# Patient Record
Sex: FEMALE | Race: WHITE | NOT HISPANIC OR LATINO | Employment: OTHER | URBAN - METROPOLITAN AREA
[De-identification: names, ages, dates, MRNs, and addresses within clinical notes are randomized per-mention and may not be internally consistent; named-entity substitution may affect disease eponyms.]

---

## 2017-01-16 ENCOUNTER — TRANSCRIBE ORDERS (OUTPATIENT)
Dept: LAB | Facility: CLINIC | Age: 82
End: 2017-01-16

## 2017-01-16 ENCOUNTER — APPOINTMENT (OUTPATIENT)
Dept: LAB | Facility: CLINIC | Age: 82
End: 2017-01-16
Payer: MEDICARE

## 2017-01-16 ENCOUNTER — GENERIC CONVERSION - ENCOUNTER (OUTPATIENT)
Dept: OTHER | Facility: OTHER | Age: 82
End: 2017-01-16

## 2017-01-16 DIAGNOSIS — I48.91 ATRIAL FIBRILLATION (HCC): ICD-10-CM

## 2017-01-16 LAB
INR PPP: 2.89 (ref 0.86–1.16)
PROTHROMBIN TIME: 29.7 SECONDS (ref 12–14.3)

## 2017-01-16 PROCEDURE — 85610 PROTHROMBIN TIME: CPT

## 2017-01-16 PROCEDURE — 36415 COLL VENOUS BLD VENIPUNCTURE: CPT

## 2017-02-13 DIAGNOSIS — I48.91 ATRIAL FIBRILLATION (HCC): ICD-10-CM

## 2017-02-23 ENCOUNTER — GENERIC CONVERSION - ENCOUNTER (OUTPATIENT)
Dept: OTHER | Facility: OTHER | Age: 82
End: 2017-02-23

## 2017-03-03 ENCOUNTER — APPOINTMENT (OUTPATIENT)
Dept: LAB | Facility: CLINIC | Age: 82
End: 2017-03-03
Payer: MEDICARE

## 2017-03-03 ENCOUNTER — GENERIC CONVERSION - ENCOUNTER (OUTPATIENT)
Dept: OTHER | Facility: OTHER | Age: 82
End: 2017-03-03

## 2017-03-03 DIAGNOSIS — I48.91 ATRIAL FIBRILLATION (HCC): ICD-10-CM

## 2017-03-03 LAB
INR PPP: 2.12 (ref 0.86–1.16)
PROTHROMBIN TIME: 23.5 SECONDS (ref 12–14.3)

## 2017-03-03 PROCEDURE — 85610 PROTHROMBIN TIME: CPT

## 2017-03-03 PROCEDURE — 36415 COLL VENOUS BLD VENIPUNCTURE: CPT

## 2017-03-27 ENCOUNTER — ALLSCRIPTS OFFICE VISIT (OUTPATIENT)
Dept: OTHER | Facility: OTHER | Age: 82
End: 2017-03-27

## 2017-03-27 DIAGNOSIS — I10 ESSENTIAL (PRIMARY) HYPERTENSION: ICD-10-CM

## 2017-03-30 DIAGNOSIS — I48.91 ATRIAL FIBRILLATION (HCC): ICD-10-CM

## 2017-04-05 ENCOUNTER — LAB CONVERSION - ENCOUNTER (OUTPATIENT)
Dept: OTHER | Facility: OTHER | Age: 82
End: 2017-04-05

## 2017-04-05 ENCOUNTER — APPOINTMENT (OUTPATIENT)
Dept: LAB | Facility: CLINIC | Age: 82
End: 2017-04-05
Payer: MEDICARE

## 2017-04-05 DIAGNOSIS — I48.91 ATRIAL FIBRILLATION (HCC): ICD-10-CM

## 2017-04-05 LAB
INR PPP: 2.67 (ref 0.86–1.16)
PROTHROMBIN TIME: 28 SECONDS (ref 12–14.3)

## 2017-04-05 PROCEDURE — 85610 PROTHROMBIN TIME: CPT

## 2017-04-05 PROCEDURE — 36415 COLL VENOUS BLD VENIPUNCTURE: CPT

## 2017-05-03 DIAGNOSIS — I48.91 ATRIAL FIBRILLATION (HCC): ICD-10-CM

## 2017-05-11 ENCOUNTER — GENERIC CONVERSION - ENCOUNTER (OUTPATIENT)
Dept: OTHER | Facility: OTHER | Age: 82
End: 2017-05-11

## 2017-05-11 ENCOUNTER — APPOINTMENT (OUTPATIENT)
Dept: LAB | Facility: CLINIC | Age: 82
End: 2017-05-11
Payer: MEDICARE

## 2017-05-11 ENCOUNTER — TRANSCRIBE ORDERS (OUTPATIENT)
Dept: MRI IMAGING | Facility: CLINIC | Age: 82
End: 2017-05-11

## 2017-05-11 DIAGNOSIS — I48.91 ATRIAL FIBRILLATION (HCC): ICD-10-CM

## 2017-05-11 LAB
INR PPP: 3.47 (ref 0.86–1.16)
PROTHROMBIN TIME: 35.4 SECONDS (ref 12.1–14.4)

## 2017-05-11 PROCEDURE — 36415 COLL VENOUS BLD VENIPUNCTURE: CPT

## 2017-05-11 PROCEDURE — 85610 PROTHROMBIN TIME: CPT

## 2017-05-18 ENCOUNTER — APPOINTMENT (OUTPATIENT)
Dept: LAB | Facility: CLINIC | Age: 82
End: 2017-05-18
Payer: MEDICARE

## 2017-05-18 ENCOUNTER — GENERIC CONVERSION - ENCOUNTER (OUTPATIENT)
Dept: OTHER | Facility: OTHER | Age: 82
End: 2017-05-18

## 2017-05-18 DIAGNOSIS — I48.91 ATRIAL FIBRILLATION (HCC): ICD-10-CM

## 2017-05-18 LAB
INR PPP: 2.1 (ref 0.86–1.16)
PROTHROMBIN TIME: 23.8 SECONDS (ref 12.1–14.4)

## 2017-05-18 PROCEDURE — 85610 PROTHROMBIN TIME: CPT

## 2017-05-18 PROCEDURE — 36415 COLL VENOUS BLD VENIPUNCTURE: CPT

## 2017-06-15 DIAGNOSIS — I48.91 ATRIAL FIBRILLATION (HCC): ICD-10-CM

## 2017-06-22 ENCOUNTER — APPOINTMENT (OUTPATIENT)
Dept: LAB | Facility: CLINIC | Age: 82
End: 2017-06-22
Payer: MEDICARE

## 2017-06-22 ENCOUNTER — TRANSCRIBE ORDERS (OUTPATIENT)
Dept: MRI IMAGING | Facility: CLINIC | Age: 82
End: 2017-06-22

## 2017-06-22 ENCOUNTER — GENERIC CONVERSION - ENCOUNTER (OUTPATIENT)
Dept: OTHER | Facility: OTHER | Age: 82
End: 2017-06-22

## 2017-06-22 DIAGNOSIS — I48.91 ATRIAL FIBRILLATION (HCC): ICD-10-CM

## 2017-06-22 LAB
INR PPP: 2.66 (ref 0.86–1.16)
PROTHROMBIN TIME: 28.7 SECONDS (ref 12.1–14.4)

## 2017-06-22 PROCEDURE — 36415 COLL VENOUS BLD VENIPUNCTURE: CPT

## 2017-06-22 PROCEDURE — 85610 PROTHROMBIN TIME: CPT

## 2017-07-20 DIAGNOSIS — I48.91 ATRIAL FIBRILLATION (HCC): ICD-10-CM

## 2017-07-25 ENCOUNTER — APPOINTMENT (OUTPATIENT)
Dept: LAB | Facility: CLINIC | Age: 82
End: 2017-07-25
Payer: MEDICARE

## 2017-07-25 DIAGNOSIS — I48.91 ATRIAL FIBRILLATION (HCC): ICD-10-CM

## 2017-07-25 LAB
INR PPP: 3.7 (ref 0.86–1.16)
PROTHROMBIN TIME: 37.3 SECONDS (ref 12.1–14.4)

## 2017-07-25 PROCEDURE — 85610 PROTHROMBIN TIME: CPT

## 2017-07-25 PROCEDURE — 36415 COLL VENOUS BLD VENIPUNCTURE: CPT

## 2017-08-02 ENCOUNTER — TRANSCRIBE ORDERS (OUTPATIENT)
Dept: LAB | Facility: CLINIC | Age: 82
End: 2017-08-02

## 2017-08-02 ENCOUNTER — GENERIC CONVERSION - ENCOUNTER (OUTPATIENT)
Dept: OTHER | Facility: OTHER | Age: 82
End: 2017-08-02

## 2017-08-02 ENCOUNTER — APPOINTMENT (OUTPATIENT)
Dept: LAB | Facility: CLINIC | Age: 82
End: 2017-08-02
Payer: MEDICARE

## 2017-08-02 DIAGNOSIS — I48.91 ATRIAL FIBRILLATION (HCC): ICD-10-CM

## 2017-08-02 LAB
INR PPP: 2.72 (ref 0.86–1.16)
PROTHROMBIN TIME: 29.2 SECONDS (ref 12.1–14.4)

## 2017-08-02 PROCEDURE — 36415 COLL VENOUS BLD VENIPUNCTURE: CPT

## 2017-08-02 PROCEDURE — 85610 PROTHROMBIN TIME: CPT

## 2017-08-21 ENCOUNTER — APPOINTMENT (OUTPATIENT)
Dept: LAB | Facility: CLINIC | Age: 82
End: 2017-08-21
Payer: MEDICARE

## 2017-08-21 ENCOUNTER — GENERIC CONVERSION - ENCOUNTER (OUTPATIENT)
Dept: OTHER | Facility: OTHER | Age: 82
End: 2017-08-21

## 2017-08-21 DIAGNOSIS — I48.91 ATRIAL FIBRILLATION (HCC): ICD-10-CM

## 2017-08-21 LAB
INR PPP: 2.56 (ref 0.86–1.16)
PROTHROMBIN TIME: 27.8 SECONDS (ref 12.1–14.4)

## 2017-08-21 PROCEDURE — 85610 PROTHROMBIN TIME: CPT

## 2017-08-21 PROCEDURE — 36415 COLL VENOUS BLD VENIPUNCTURE: CPT

## 2017-08-28 ENCOUNTER — GENERIC CONVERSION - ENCOUNTER (OUTPATIENT)
Dept: OTHER | Facility: OTHER | Age: 82
End: 2017-08-28

## 2017-08-28 ENCOUNTER — ALLSCRIPTS OFFICE VISIT (OUTPATIENT)
Dept: OTHER | Facility: OTHER | Age: 82
End: 2017-08-28

## 2017-08-28 DIAGNOSIS — I10 ESSENTIAL (PRIMARY) HYPERTENSION: ICD-10-CM

## 2017-09-06 ENCOUNTER — APPOINTMENT (OUTPATIENT)
Dept: LAB | Facility: CLINIC | Age: 82
End: 2017-09-06
Payer: MEDICARE

## 2017-09-06 DIAGNOSIS — I10 ESSENTIAL (PRIMARY) HYPERTENSION: ICD-10-CM

## 2017-09-06 LAB
ALBUMIN SERPL BCP-MCNC: 3.5 G/DL (ref 3.5–5)
ALP SERPL-CCNC: 66 U/L (ref 46–116)
ALT SERPL W P-5'-P-CCNC: 12 U/L (ref 12–78)
ANION GAP SERPL CALCULATED.3IONS-SCNC: 5 MMOL/L (ref 4–13)
AST SERPL W P-5'-P-CCNC: 17 U/L (ref 5–45)
BASOPHILS # BLD AUTO: 0.04 THOUSANDS/ΜL (ref 0–0.1)
BASOPHILS NFR BLD AUTO: 1 % (ref 0–1)
BILIRUB SERPL-MCNC: 1.15 MG/DL (ref 0.2–1)
BUN SERPL-MCNC: 27 MG/DL (ref 5–25)
CALCIUM SERPL-MCNC: 9.3 MG/DL (ref 8.3–10.1)
CHLORIDE SERPL-SCNC: 102 MMOL/L (ref 100–108)
CHOLEST SERPL-MCNC: 167 MG/DL (ref 50–200)
CO2 SERPL-SCNC: 33 MMOL/L (ref 21–32)
CREAT SERPL-MCNC: 0.74 MG/DL (ref 0.6–1.3)
EOSINOPHIL # BLD AUTO: 0.07 THOUSAND/ΜL (ref 0–0.61)
EOSINOPHIL NFR BLD AUTO: 2 % (ref 0–6)
ERYTHROCYTE [DISTWIDTH] IN BLOOD BY AUTOMATED COUNT: 13.4 % (ref 11.6–15.1)
GFR SERPL CREATININE-BSD FRML MDRD: 75 ML/MIN/1.73SQ M
GLUCOSE P FAST SERPL-MCNC: 84 MG/DL (ref 65–99)
HCT VFR BLD AUTO: 40.7 % (ref 34.8–46.1)
HDLC SERPL-MCNC: 60 MG/DL (ref 40–60)
HGB BLD-MCNC: 12.9 G/DL (ref 11.5–15.4)
LDLC SERPL CALC-MCNC: 87 MG/DL (ref 0–100)
LYMPHOCYTES # BLD AUTO: 1.28 THOUSANDS/ΜL (ref 0.6–4.47)
LYMPHOCYTES NFR BLD AUTO: 28 % (ref 14–44)
MCH RBC QN AUTO: 29.7 PG (ref 26.8–34.3)
MCHC RBC AUTO-ENTMCNC: 31.7 G/DL (ref 31.4–37.4)
MCV RBC AUTO: 94 FL (ref 82–98)
MONOCYTES # BLD AUTO: 0.48 THOUSAND/ΜL (ref 0.17–1.22)
MONOCYTES NFR BLD AUTO: 10 % (ref 4–12)
NEUTROPHILS # BLD AUTO: 2.78 THOUSANDS/ΜL (ref 1.85–7.62)
NEUTS SEG NFR BLD AUTO: 59 % (ref 43–75)
NRBC BLD AUTO-RTO: 0 /100 WBCS
PLATELET # BLD AUTO: 126 THOUSANDS/UL (ref 149–390)
PMV BLD AUTO: 11.2 FL (ref 8.9–12.7)
POTASSIUM SERPL-SCNC: 4.3 MMOL/L (ref 3.5–5.3)
PROT SERPL-MCNC: 7.1 G/DL (ref 6.4–8.2)
RBC # BLD AUTO: 4.35 MILLION/UL (ref 3.81–5.12)
SODIUM SERPL-SCNC: 140 MMOL/L (ref 136–145)
TRIGL SERPL-MCNC: 98 MG/DL
TSH SERPL DL<=0.05 MIU/L-ACNC: 1 UIU/ML (ref 0.36–3.74)
WBC # BLD AUTO: 4.66 THOUSAND/UL (ref 4.31–10.16)

## 2017-09-06 PROCEDURE — 84443 ASSAY THYROID STIM HORMONE: CPT

## 2017-09-06 PROCEDURE — 85025 COMPLETE CBC W/AUTO DIFF WBC: CPT

## 2017-09-06 PROCEDURE — 80061 LIPID PANEL: CPT

## 2017-09-06 PROCEDURE — 80053 COMPREHEN METABOLIC PANEL: CPT

## 2017-09-06 PROCEDURE — 36415 COLL VENOUS BLD VENIPUNCTURE: CPT

## 2017-09-18 DIAGNOSIS — I48.91 ATRIAL FIBRILLATION (HCC): ICD-10-CM

## 2017-09-27 ENCOUNTER — TRANSCRIBE ORDERS (OUTPATIENT)
Dept: LAB | Facility: CLINIC | Age: 82
End: 2017-09-27

## 2017-09-27 ENCOUNTER — APPOINTMENT (OUTPATIENT)
Dept: LAB | Facility: CLINIC | Age: 82
End: 2017-09-27
Payer: MEDICARE

## 2017-09-27 ENCOUNTER — GENERIC CONVERSION - ENCOUNTER (OUTPATIENT)
Dept: OTHER | Facility: OTHER | Age: 82
End: 2017-09-27

## 2017-09-27 DIAGNOSIS — I48.91 ATRIAL FIBRILLATION (HCC): ICD-10-CM

## 2017-09-27 LAB
INR PPP: 2.48 (ref 0.86–1.16)
PROTHROMBIN TIME: 27.1 SECONDS (ref 12.1–14.4)

## 2017-09-27 PROCEDURE — 36415 COLL VENOUS BLD VENIPUNCTURE: CPT

## 2017-09-27 PROCEDURE — 85610 PROTHROMBIN TIME: CPT

## 2017-10-24 NOTE — PROGRESS NOTES
Assessment  Assessed    1  Coronary arteriosclerosis (414 00) (I25 10)   2  Hypertension (401 9) (I10)   3  Rib pain on right side (786 50) (R07 81)   4  Atrial fibrillation (427 31) (I48 91)   5  Anticoagulant long-term use (V58 61) (Z79 01)   6  Venous insufficiency (459 81) (I87 2)   7  Chronic obstructive pulmonary disease (496) (J44 9)    Plan  Atrial fibrillation    · (1) CBC/PLT/DIFF; Status:Canceled;    Perform:[facility];Ordered; For:Atrial fibrillation; Ordered By:Robin Drummond;   · (1) COMPREHENSIVE METABOLIC PANEL; Status:Canceled;    Perform:[facility];Ordered; For:Atrial fibrillation; Ordered By:Robin Drummond;  Atrial fibrillation, Hyperlipidemia    · (1) LIPID PANEL, FASTING; Status:Canceled;    Perform:[facility]; For:Atrial fibrillation, Hyperlipidemia; Ordered By:Robin Drummond; Hypertension    · (1) CBC/PLT/DIFF; Status:Active; Requested for:28Aug2017;    Perform:[facility]; Due:28Aug2018; Ordered;For:Hypertension; Ordered By:Robin Drummond;   · (1) COMPREHENSIVE METABOLIC PANEL; Status:Active; Requested for:28Aug2017;    Perform:[facility]; Due:28Aug2018; Ordered;For:Hypertension; Ordered By:Robin Drummond;   · (1) LIPID PANEL, FASTING; Status:Active; Requested for:28Aug2017;    Perform:[facility]; Due:28Aug2018; Ordered;For:Hypertension; Ordered By:Robin Drummond;   · (1) TSH; Status:Active; Requested for:28Aug2017;    Perform:[facility]; Due:28Aug2018; Ordered;For:Hypertension; Ordered By:Elie Drummond;  Rib pain on right side    · Vicodin 5-300 MG Oral Tablet; TAKE 1 TABLET EVERY 4 TO 6 HOURS AS NEEDED  FOR PAIN   Rx By: Nevin Painter; Dispense: 4 Days ; #:20 Tablet;  Refill: 0;For: Rib pain on right side; LACIE = N; Print Rx    Discussion/Summary  Cardiology Discussion Summary Free Text Note Form [facility]:   Pt is cardiovascularly stable-- no angina CHF symptomatic ectopy has A  fib with controlled rate no bleeding on anticoagulationpt/inr report any bleeding--reviewed lab tests from April 2016 with cholesterol of 180 normal CBC normal renal function normal LFTs normal sugar follow-up lab test ordereds with pcp and pulmonarymeasures reviewed in detailsigns and symptoms of abnormal bleeding on anticoagulationall meds  Report any symptoms  All questions answered  Previous studies reviewed with patient, medications reviewed and possible side effects discussed  Continue risk factor modification  All questions answered  Safety measures reviewed-in detail particularly with anticoagulation Patient advised to report any problems promptly to medical attention  Discussed concepts of atherosclerosis, signs and symptoms of cardiac disease including ischemia arrhythmia CHF  Optimize weight, regular exercise and follow up with appropriate specialists as discussed  Return for follow up visit in 5 months or earlier if needed  Referred to derm for possible alopecia  Follow-up with pulmonary PCP all questions answered lab tests to be checkedtrial of when necessary Vicodin for uncomfortable musculoskeletal rib pain from traumaquestions answered      Counseling Documentation With Imm: The patient, patient's family was counseled regarding diagnostic results,-- instructions for management,-- risk factor reductions,-- risks and benefits of treatment options,-- importance of compliance with treatment  Chief Complaint  Chief Complaint Free Text Note Form: Patient seen for cardiac follow up evaluation-- known history of hypertension and atrial fibrillation  COPD CAD on CAT scan, venous insufficiency DJD     Chief Complaint Chronic Condition [facility]: Patient is here today for follow up of chronic conditions described in HPI  History of Present Illness  Cardiology HPI Free Text Note Form [facility]: Patient presents for follow up visit with her   History of CAD with calcifications noted on CAT scan no clinical ischemia   no history of MI preserved LV function  No angina, CHF, palpitations, syncope, seizures, CVA/TIA, dizziness, lightheadedness, amaurosis, orthostasis, myositis or edema  No urinary or bowel complaints  No rashes, fevers, arrhythmic symptoms, or thromboembolic symptoms  No PND  with markedly sedentary lifestyle uses a wheelchair cane has COPD and arthritis -previously referred to [facility] for patch of alopeciatraumatized her right lateral ribs with tenderness when necessary Vicodin follow-up with orthopedics if needed    Previous hip fracture s/p repair this past March 2014 - continues to use a walker to ambulate no falls patient is very cautious  Has history of COPD pulmonary hypertension Reports chronic stable shortness of breath with exertion-chronic coughing tendencies with occasional scant clear sputum--Occasional wheezing - in the past but not lately Denies any exertional chest pains  Is on home oxygen regular nebulizers during the day follows with pulmonary Dr Law [place] refuses vaccinationspulmonary hypertension chronic venous disease controlled atrial fib rate with no arrhythmic symptoms no neurologic complaints emotionally doing well careful with avoiding falls at hip fracture 2006 end 2014  has a history of HTN- lopressor, quinapril Afib- on coumadin, metoprolol digoxin pulmonary hypertension-venous insufficiency on Lasix  has gone for previous mammogram and colonoscopy  [ Atrial fibrillation, pulmonary hypertension, COPD, anxiety, Hip fracture 2006, kyphosis, hypertension, chronic venous disease  ]  [ No history of family CAD  ]  [ Prior heavy smoking, alcohol socially  No drug abuse     Younger brother - lives in [place], [place]  ]  CVA TIA amaurosis fevers or wheezes diplopia GI or  symptoms no history of MI history of mild MR aortic calcification preserved LV function has moderate TR with pulmonary hypertension with PA pressures of 55   good spirits has 13 grandchildren 6 great-grandchildrenbleeding bruising on anticoagulation no melena hematuria hematochezia no orthostasis myositis         Review of Systems  Cardiology Female ROS:     Cardiac: No complaints of chest pain, no palpitations, no fainting  Skin: No complaints of nonhealing sores or skin rash  Genitourinary: No complaints of recurrent urinary tract infections, frequent urination at night, difficult urination, blood in urine, kidney stones, loss of bladder control, kidney problems, denies any birth control or hormone replacement, is not post menopausal, not currently pregnant  Psychological: No complaints of feeling depressed, anxiety, panic attacks, or difficulty concentrating  General: No complaints of trouble sleeping, lack of energy, fatigue, appetite changes, weight changes, fever, frequent infections, or night sweats  Respiratory: shortness of breath--   Chronic unchanged low level some wheezing tendencies  HEENT: No complaints of serious problems, hearing problems, nose problems, throat problems, or snoring  Gastrointestinal: No complaints of liver problems, nausea, vomiting, heartburn, constipation, bloody stools, diarrhea, problems swallowing, adbominal pain, or rectal bleeding  Hematologic: No complaints of bleeding disorders, anemia, blood clots, or excessive brusing  Neurological: No complaints of numbness, tingling, dizziness, weakness, seizures, headaches, syncope or fainting, AM fatigue, daytime sleepiness, no witnessed apnea episodes  Musculoskeletal: arthritis      Active Problems  Problems    1  Alopecia (704 00) (L65 9)   2  Anticoagulant long-term use (V58 61) (Z79 01)   3  Atrial fibrillation (427 31) (I48 91)   4  Chronic obstructive pulmonary disease (496) (J44 9)   5  Coronary arteriosclerosis (414 00) (I25 10)   6  Ecchymosis (459 89) (R58)   7  Hyperlipidemia (272 4) (E78 5)   8  Hypertension (401 9) (I10)   9  Primary pulmonary hypertension (416 0) (I27 0)   10   Pulmonary artery hypertension (416 8) (I27 2)   11  Pulmonary emphysema (492 8) (J43 9)   12  Rib pain on right side (786 50) (R07 81)   13  Shortness of breath (786 05) (R06 02)   14  Venous insufficiency (459 81) (I87 2)    Past Medical History  Problems    1  History of Anxiety (300 00) (F41 9)   2  History of Echo (2-D)   3  History of Echocardiogram   4  History of Echocardiogram   5  History of Echocardiogram   6  History of Emphysema (492 8) (J43 9)   7  History of atrial fibrillation (V12 59) (Z86 79)   8  History of atrial fibrillation (V12 59) (Z86 79)   9  History of chronic obstructive lung disease (V12 69) (Z87 09)   10  History of fracture of hip (V15 51) (Z87 81)   11  History of hyperlipidemia (V12 29) (Z86 39)   12  History of restrictive lung disease (V12 69) (Z87 09)   13  History of shortness of breath (V13 89) (Z87 898)   14  Hypertension (401 9) (I10)   15  History of Osteoarthritis (715 90) (M19 90)   16  Personal history of kyphosis (V13 59) (Z87 39)   17  Personal history of pulmonary hypertension (V12 59) (Z86 79)   18  History of Primary pulmonary hypertension (416 0) (I27 0)   19  History of Stress Test ECG Performed   20  History of Venous insufficiency (chronic) (peripheral) (459 81) (I87 2)  Active Problems And Past Medical History Reviewed: The active problems and past medical history were reviewed and updated today  Surgical History  Problems    1  History of Hip Surgery   2  History of Reported Hx Of Hip Replacement  Surgical History Reviewed: The surgical history was reviewed and updated today  Family History  Family History    1  Family history of Cardiovascular Disorder (V17 49)  Family History Reviewed: The family history was reviewed and updated today  Social History  Problems    · Alcohol Use (History)   · Former smoker (C83 64) (O59 337)  Social History Reviewed: The social history was reviewed and updated today  Current Meds   1   Albuterol Sulfate (2 5 MG/3ML) 0 083% Inhalation Nebulization Solution; One vial via   nebulizer once daily when necessary; Therapy: 37CGJ4075 to Recorded   2  Brovana 15 MCG/2ML Inhalation Nebulization Solution; INHALE THE CONTENTS OF 1   VIAL 2 TIMES DAILY; Therapy: 69BPN8018 to (Evaluate:14Oct2016); Last Rx:88Nbq6452 Ordered   3  Digoxin 125 MCG Oral Tablet; Take 1 tablet daily; Therapy: 05MGN5332 to (Last Rx:26Amk0232)  Requested for: 49HLV0888 Ordered   4  Furosemide 20 MG Oral Tablet; TAKE 1 TABLET THREE TIMES A DAY; Therapy: 69Ril7257 to (Last Rx:89Pvz0369)  Requested for: 32Eqh1335 Ordered   5  Ipratropium-Albuterol 0 5-2 5 (3) MG/3ML Inhalation Solution; one vial via nebulizer once   daily; Therapy: 16JJT3764 to (Evaluate:07Nov2015); Last Rx:87Rvv1296 Ordered   6  Klor-Con M20 20 MEQ Oral Tablet Extended Release; Take 1 tablet daily; Therapy: 24IMM2190 to (Last Rx:04Jun2015)  Requested for: 48BZT5831 Ordered   7  Metoprolol Succinate ER 50 MG Oral Tablet Extended Release 24 Hour; Take 1 tablet   daily; Therapy: 29KVQ9355 to (Last Rx:27Mar2017)  Requested for: 27Mar2017 Ordered   8  Multi Vitamin/Minerals TABS; TAKE 1 TABLET DAILY; Therapy: (Recorded:08Jan2014) to Recorded   9  Potassium Chloride Hope ER 20 MEQ Oral Tablet Extended Release; Take 1 tablet daily; Therapy: 53JDQ2488 to (Last UX:20PJM4407)  Requested for: 21UKY3309 Ordered   10  Quinapril HCl - 20 MG Oral Tablet; TAKE ONE TABLETS DAILY; Therapy: 55FAN8306 to  Requested for: 89ISY0133 Recorded   11  Warfarin Sodium 2 MG Oral Tablet; TAKE 1 TO 2 TABLETS DAILY AS DIRECTED; Therapy: 29Bof8874 to (Last Rx:02Nov2016)  Requested for: 07MRB0903 Ordered   12  Zolpidem Tartrate 10 MG Oral Tablet; 1 TAB PO QD HS; Therapy: 50Csf2691 to (Last Rx:70Djy1180) Ordered  Medication List Reviewed: The medication list was reviewed and updated today  Allergies  Medication    1   No Known Drug Allergies    Vitals  Vital Signs    Recorded: 56Ycs9802 01:55PM   Heart Rate 64   Systolic 284   Diastolic 62   Height 5 ft 3 in   Weight 130 lb 8 0 oz   BMI Calculated 23 12   BSA Calculated 1 62     Physical Exam    Constitutional   General appearance: Abnormal  -- frail elderly female in a wheelchair  Eyes   Conjunctiva and Sclera examination: Conjunctiva pink, sclera anicteric  Ears, Nose, Mouth, and Throat - External inspection of ears and nose: Normal without deformities or discharge  -- Nasal mucosa, septum, and turbinates: Normal, no edema or discharge  -- Oropharynx: Clear, nares are clear, mucous membranes are moist    Neck   Neck and thyroid: Normal, supple, trachea midline, no thyromegaly  Pulmonary   Respiratory effort: No increased work of breathing or signs of respiratory distress  -- There all chest able to speak with no difficulty or shortness of breath  Auscultation of lungs: Abnormal  -- markedly diminished breath sounds with fine crackles  Cardiovascular   Palpation of heart: Normal PMI, no thrills  Auscultation of heart: Abnormal  -- S1-S2 normal 2/6 systolic ejection murmur at left sternal border  Carotid pulses: Normal, 2+ bilaterally  Femoral pulses: Normal, 2+ bilaterally  No abdominal aorta pulsations  Pedal pulses: Normal, 2+ bilaterally  Examination of extremities for edema and/or varicosities: Abnormal  -- 1-2+ edema marked varicosities and venous insufficiency skin changes  Chest - Chest: Abnormal -- kyphosis  Abdomen   Abdomen: Non-tender and no distention  Liver and spleen: No hepatomegaly or splenomegaly  Musculoskeletal Gait and station: Abnormal -- Using a walker/wheelchair to ambulate  -- Digits and nails: Normal without clubbing or cyanosis  Skin - Skin and subcutaneous tissue: Abnormal -- skin changes of stasis dermatitis marked pigmentation noted chronic edema venous insufficiency of the legs- balding spot on scalp     Neurologic - Speech: Normal     Psychiatric - Orientation to person, place, and time: Normal -- Mood and affect: Normal    Additional Findings - Tenderness localized left lateral lower ribs no ecchymosis shingles rash or obvious fracture        Signatures   Electronically signed by : WILL Trammell ; Aug 28 2017  3:55PM EST                       (Author)

## 2017-10-25 DIAGNOSIS — I48.91 ATRIAL FIBRILLATION (HCC): ICD-10-CM

## 2017-11-03 ENCOUNTER — GENERIC CONVERSION - ENCOUNTER (OUTPATIENT)
Dept: OTHER | Facility: OTHER | Age: 82
End: 2017-11-03

## 2017-11-03 ENCOUNTER — TRANSCRIBE ORDERS (OUTPATIENT)
Dept: LAB | Facility: CLINIC | Age: 82
End: 2017-11-03

## 2017-11-03 ENCOUNTER — APPOINTMENT (OUTPATIENT)
Dept: LAB | Facility: CLINIC | Age: 82
End: 2017-11-03
Payer: MEDICARE

## 2017-11-03 DIAGNOSIS — I48.91 ATRIAL FIBRILLATION (HCC): ICD-10-CM

## 2017-11-03 LAB
INR PPP: 2.35 (ref 0.86–1.16)
PROTHROMBIN TIME: 26 SECONDS (ref 12.1–14.4)

## 2017-11-03 PROCEDURE — 36415 COLL VENOUS BLD VENIPUNCTURE: CPT

## 2017-11-03 PROCEDURE — 85610 PROTHROMBIN TIME: CPT

## 2017-11-30 DIAGNOSIS — I48.91 ATRIAL FIBRILLATION (HCC): ICD-10-CM

## 2017-12-08 ENCOUNTER — TRANSCRIBE ORDERS (OUTPATIENT)
Dept: LAB | Facility: CLINIC | Age: 82
End: 2017-12-08

## 2017-12-08 ENCOUNTER — LAB CONVERSION - ENCOUNTER (OUTPATIENT)
Dept: OTHER | Facility: OTHER | Age: 82
End: 2017-12-08

## 2017-12-08 ENCOUNTER — APPOINTMENT (OUTPATIENT)
Dept: LAB | Facility: CLINIC | Age: 82
End: 2017-12-08
Payer: MEDICARE

## 2017-12-08 DIAGNOSIS — I48.91 ATRIAL FIBRILLATION (HCC): ICD-10-CM

## 2017-12-08 LAB
INR PPP: 2.85 (ref 0.86–1.16)
INR PPP: 2.85 (ref 0.86–1.16)
PROTHROMBIN TIME: 30.3 SECONDS (ref 12.1–14.4)

## 2017-12-08 PROCEDURE — 85610 PROTHROMBIN TIME: CPT

## 2017-12-08 PROCEDURE — 36415 COLL VENOUS BLD VENIPUNCTURE: CPT

## 2018-01-09 NOTE — RESULT NOTES
Verified Results  (1) PT WITH INR 51Djk1367 10:41AM Desi Mcghee Order Number: [id number]     Test Name Result Flag Reference   INR 2 67 H 0 86-1 16   PT 28 0 seconds H 12 0-14 3

## 2018-01-10 NOTE — RESULT NOTES
Verified Results  (1) PT WITH INR 57Hgi9542 09:47AM Karlie Thompson Order Number: [id number]     Order Number: [id number]     Test Name Result Flag Reference   INR 3 09 H 0 86-1 16   PT 30 2 seconds H 11 8-14 1       Plan  Atrial fibrillation, Chronic obstructive pulmonary disease, Coronary arteriosclerosis,  Hyperlipidemia, Hypertension, Primary pulmonary hypertension    · Metoprolol Succinate ER 50 MG Oral Tablet Extended Release 24 Hour;  Take 1  tablet daily

## 2018-01-10 NOTE — PROGRESS NOTES
REPORT NAME: Patient Visit Summary Report   VISIT DATE: 1/16/2017  VISIT TIME: 10:42 AM EST  PATIENT NAME: Luis F Guerrero  MEDICAL RECORD NUMBER: [id number]  SOCIAL SECURITY NUMBER:   YOB: 1934  AGE: 80  REFERRING PHYSICIAN: Irma Mayfield  SUPERVISING CLINICIAN: Irma Mayfield  HEALTH CARE PROFESSIONAL: Ai Acosta   PATIENT HOME ADDRESS: [address] PHONE: [phone]  DIAGNOSIS 1: Atrial Fibrillation / 427 31  DIAGNOSIS 2: Long-term (current) use of anticoagulants / V58 61  DIAGNOSIS 3:   DIAGNOSIS 4:   INR RANGE: 2 - 3  INR GOAL: 2 5  TREATMENT START DATE:   TREATMENT END DATE:   NEXT VISIT:       VISIT RESULTS   ENCOUNTER NUMBER:   TEST LOCATION: [facility]  TEST TYPE: Outside Lab (Venipuncture)  VISIT TYPE:   CURRENT INR: 2 89 PROTIME:   SPECIMEN COL AND RPT DATE: 1/16/2017 10:42 AM  EST    VITAL SIGNS  PULSE:  B/P:  WEIGHT:  HEIGHT:  TEMP:     CURRENT ANTICOAGULANT DOSING SCHEDULE  DOSE SIZE: 5mg    ANTICOAGULANT TYPE: COUMADIN  DOSING REGIMEN  Sun       Mon Tues Wed Thurs Fri       Sat  Total/Wk  2         1         2         1         2         1         2         11    PATIENT MEDICATION INSTRUCTION: Yes  PATIENT NUTRITIONAL COUNSELING: No  PATIENT BRUISING INSTRUCTION: No      LAST EDUCATION DATE:       PREVIOUS VISIT INFORMATION  VISITDATE   INR Goal  INR   Sun     Mon Tues Wed Thurs Fri  Sat     Total/wk  1/16/2017   2 5       2 89  2       1       2       1       2       1  2       11  11/25/2016  2 5       2 89  2       1       2       1       2       1  2       11  10/24/2016  2 5       2 67  2       1       2       1       2       1  2       11  9/28/2016   2 5       3 19  2       1       2       1       2       1  2       11    ADDITIONAL PREVIOUS VISIT INFORMATION  VISITDATE   PRIMARY RX               DOSE      CrCl  1/16/2017   COUMADIN                 5mg 11/25/2016  COUMADIN                 5mg                 10/24/2016  COUMADIN                 5mg                 9/28/2016   COUMADIN                 5mg                     OTHER CURRENT MEDICATIONS: COUMADIN      PROGRESS NOTES: PER DR FLORES SAME DOSE RECHECK 4 WEEKS, HE SPOKE WITH PT LAST  NIGHT    PATIENT INSTRUCTIONS: SEE PROGRESS NOTE    TEST LOCATION: [facility])    Electronically signed by: Ana Laura Eaton  on 1/17/2017 at 10:42 AM EST

## 2018-01-10 NOTE — PROGRESS NOTES
REPORT NAME: Progress Notes Report  VISIT DATE: 8/2/2017  VISIT TIME: 4:12 PM EDT  PATIENT NAME: Evgeny Francisco  MEDICAL RECORD NUMBER: [id number]  YOB: 1934  AGE: 80  REFERRING PHYSICIAN: Perez Ritter  SUPERVISING CLINICIAN: Arpita Oliveira CARE PROVIDER: Ashley Crum  PATIENT HOME ADDRESS: [address] PHONE: [phone]  SOCIAL SECURITY NUMBER:   DIAGNOSIS 1: Atrial Fibrillation / 427 31  DIAGNOSIS 2: Long-term (current) use of anticoagulants / V58 61  INR RANGE: 2 - 3  INR GOAL: 2 5  TREATMENT START DATE:   TREATMENT END DATE:   NEXT VISIT:     VISIT RESULTS  ENCOUNTER NUMBER:   TEST LOCATION: [facility]  TEST TYPE: Outside Lab (Venipuncture)  VISIT TYPE:   CURRENT INR: 2 72 PROTIME:   SPECIMEN COL AND RPT DATE: 8/2/2017 4:12 PM  EDT  VITAL SIGNS  PULSE:  BP: / WEIGHT:  HEIGHT:  TEMP:   CURRENT ANTICOAGULANT DOSING SCHEDULE  DOSE SIZE: 5mg    ANTICOAGULANT TYPE: COUMADIN  DOSING REGIMEN  Sun       Mon Tues Wed Thurs Fri       Sat  Total/Wk  2         1         1         2         1         1         2         10  PATIENT MEDICATION INSTRUCTION: Yes  PATIENT NUTRITIONAL COUNSELING: No  PATIENT BRUISING INSTRUCTION: No  LAST EDUCATION DATE:   PREVIOUS VISIT INFORMATION  VISITDATE  INRGoal INR   Sun    Mon    Tues   Wed    Thurs  Fri    Sat  Total/wk  8/2/2017    2 5     2 72  2      1      1      2      1      1      2  10  7/25/2017   2 5     3 7   2      1      1      2      1      1      2  10  6/22/2017   2 5     2 66  2      1      2      1      2      1      2  11  5/18/2017   2 5     2 1   2      1      2      1      2      1      2  11  ADDITIONAL PREVIOUS VISIT INFORMATION  VISITDATE   PRIMARY RX               DOSE      CrCl  8/2/2017    COUMADIN                 5mg  7/25/2017   COUMADIN                 5mg  6/22/2017   COUMADIN                 5mg  5/18/2017   COUMADIN                 5mg  OTHER CURRENT MEDICATIONS:  COUMADIN  PROGRESS NOTES: PER DR FLORES SAME DOSE RECHECK 2 WEEKS, SPOKE WITH PT  PATIENT INSTRUCTIONS: SEE PROGRESS NOTE  TEST LOCATION: [facility]), , ,   INBOUND LAB DATA:  Lab       Lab Value Col Date                 Rpt Date                 Lab  Reference Range  Electronically signed by: Dustin Jefferson on 8/2/2017 4:12 PM EDT

## 2018-01-10 NOTE — RESULT NOTES
Verified Results  (1) TSH [id number] 09:51AM Edgar Sevilla Order Number: [id number]     Test Name Result Flag Reference   TSH 1 000 uIU/mL  0 358-3 740   Patients undergoing fluorescein dye angiography may retain small amounts of fluorescein in the body for 48-72 hours post procedure  Samples containing fluorescein can produce falsely depressed TSH values  If the patient had this procedure,a specimen should be resubmitted post fluorescein clearance            The recommended reference ranges for TSH during pregnancy are as follows:  First trimester 0 1 to 2 5 uIU/mL  Second trimester  0 2 to 3 0 uIU/mL  Third trimester 0 3 to 3 0 uIU/m

## 2018-01-11 NOTE — PROGRESS NOTES
REPORT NAME: Patient Visit Summary Report   VISIT DATE: 9/14/2016  VISIT TIME: 2:16 PM EDT  PATIENT NAME: Mark Leong  MEDICAL RECORD NUMBER: [id number]  SOCIAL SECURITY NUMBER:    YOB: 1934  AGE: 80  REFERRING PHYSICIAN: Johana Rain  SUPERVISING CLINICIAN: Johana Rain  HEALTH CARE PROFESSIONAL: Bobby Officer   PATIENT HOME ADDRESS: [place] [address], [address] PHONE:  [phone]  DIAGNOSIS 1: Atrial Fibrillation / 427 31  DIAGNOSIS 2: Long-term (current) use of anticoagulants / V58 61  DIAGNOSIS 3:   DIAGNOSIS 4:   INR RANGE: 2 - 3  INR GOAL: 2 5  TREATMENT START DATE:   TREATMENT END DATE:    NEXT VISIT:       VISIT RESULTS   ENCOUNTER NUMBER:   TEST LOCATION: [facility]  TEST TYPE: Outside Lab (Venipuncture)  VISIT TYPE:   CURRENT INR: 3 57 PROTIME:   SPECIMEN COL AND RPT DATE: 9/14/2016 2:16 PM  EDT     VITAL SIGNS  PULSE:  B/P:  WEIGHT:  HEIGHT:  TEMP:     CURRENT ANTICOAGULANT DOSING SCHEDULE  DOSE SIZE: 5mg    ANTICOAGULANT TYPE: COUMADIN  DOSING REGIMEN  Sun       Mon Tues Wed Thurs Fri       Sat  Total/Wk   2         1         2         1         2         1         2         11    PATIENT MEDICATION INSTRUCTION: Yes  PATIENT NUTRITIONAL COUNSELING: No  PATIENT BRUISING INSTRUCTION: No      LAST EDUCATION DATE:       PREVIOUS VISIT  INFORMATION  VISITDATE   INR Goal  INR   Sun     Mon Tues Wed Thurs Fri  Sat     Total/wk  9/14/2016   2 5       3 57  2       1       2       1       2       1  2       11  8/24/2016   2 5       3 53  2       2       1        2       2       1  2       12  8/3/2016    2 5       3 31  2       2       1       2       2       1  2       12  7/13/2016   2 5       3 07  2       2       1       2       2       1  2       12    ADDITIONAL PREVIOUS VISIT INFORMATION   VISITDATE   PRIMARY RX               DOSE      CrCl  9/14/2016   COUMADIN                 5mg 8/24/2016   COUMADIN                 5mg                 8/3/2016    COUMADIN                 5mg                 7/13/2016   COUMADIN                  5mg                     OTHER CURRENT MEDICATIONS: COUMADIN      PROGRESS NOTES: PER AS/PA HOLD 1 DAY THEN 2/1MG RECHECK 2 WEEKS, SPOKE WITH  PT    PATIENT INSTRUCTIONS: SEE PROGRESS NOTE    [facility]    Electronically signed by: Watson Barber  on 9/14/2016 at 2:16 PM EDT              Electronically signed Yariel CHAVEZ    Jun 8 2017  5:36PM EST

## 2018-01-11 NOTE — RESULT NOTES
Verified Results  (1) PT WITH INR [id number] 10:44AM Nikhil Avina   TW Order Number: [id number]  TW Order Number: [id number]     Test Name Result Flag Reference   INR 2 79 H 0 86-1 16   PT 28 9 seconds H 12 0-14 3

## 2018-01-11 NOTE — PROGRESS NOTES
REPORT NAME: Patient Visit Summary Report   VISIT DATE: 11/25/2016  VISIT TIME: 9:50 AM EST  PATIENT NAME: Katlin Dooley  MEDICAL RECORD NUMBER: [id number]  SOCIAL SECURITY NUMBER:   YOB: 1934  AGE: 80  REFERRING PHYSICIAN: Louise Blue  SUPERVISING CLINICIAN: Louise Blue  HEALTH CARE PROFESSIONAL: Carlos Centeno   PATIENT HOME ADDRESS: [place] [address], [address] PHONE: [phone]  DIAGNOSIS 1: Atrial Fibrillation / 427 31  DIAGNOSIS 2: Long-term (current) use of anticoagulants / V58 61  DIAGNOSIS 3:   DIAGNOSIS 4:   INR RANGE: 2 - 3  INR GOAL: 2 5  TREATMENT START DATE:   TREATMENT END DATE:   NEXT VISIT:       VISIT RESULTS   ENCOUNTER NUMBER:   TEST LOCATION: [facility]  TEST TYPE: Outside Lab (Venipuncture)  VISIT TYPE:   CURRENT INR: 2 89 PROTIME:   SPECIMEN COL AND RPT DATE: 11/25/2016 9:50 AM  EST    VITAL SIGNS  PULSE:  B/P:  WEIGHT:  HEIGHT:  TEMP:     CURRENT ANTICOAGULANT DOSING SCHEDULE  DOSE SIZE: 5mg    ANTICOAGULANT TYPE: COUMADIN  DOSING REGIMEN  Sun       Mon Tues Wed Thurs Fri       Sat  Total/Wk  2         1         2         1         2         1         2         11    PATIENT MEDICATION INSTRUCTION: Yes  PATIENT NUTRITIONAL COUNSELING: No  PATIENT BRUISING INSTRUCTION: No      LAST EDUCATION DATE:       PREVIOUS VISIT INFORMATION  VISITDATE   INR Goal  INR   Sun     Mon Tues Wed Thurs Fri  Sat     Total/wk  11/25/2016  2 5       2 89  2       1       2       1       2       1  2       11  10/24/2016  2 5       2 67  2       1       2       1       2       1  2       11  9/28/2016   2 5       3 19  2       1       2       1       2       1  2       11  9/14/2016   2 5       3 57  2       1       2       1       2       1  2       11    ADDITIONAL PREVIOUS VISIT INFORMATION  VISITDATE   PRIMARY RX               DOSE      CrCl  11/25/2016  COUMADIN                 5mg 10/24/2016  COUMADIN                 5mg                 9/28/2016   COUMADIN                 5mg                 9/14/2016   COUMADIN                 5mg                     OTHER CURRENT MEDICATIONS: COUMADIN      PROGRESS NOTES: PER SS/NP SAME DOSE RECHECK 3 WEEKS, SPOKE WITH PT    PATIENT INSTRUCTIONS: SEE PROGRESS NOTE    TEST LOCATION: [facility]   [facility])    Electronically signed by: Mary Sher  on 11/28/2016 at 9:50 AM EST

## 2018-01-11 NOTE — PROGRESS NOTES
REPORT NAME: Progress Notes Report  VISIT DATE: 5/18/2017  VISIT TIME: 10:00 AM EDT  PATIENT NAME: Mamie ZHOU  MEDICAL RECORD NUMBER: [id number]  YOB: 1934  AGE: 80  REFERRING PHYSICIAN: Desirae Hargrove  SUPERVISING CLINICIAN: Arpita Oliveira CARE PROVIDER: Tony Pope  PATIENT HOME ADDRESS: [address] [place] [address] PHONE: [phone]  SOCIAL SECURITY NUMBER:   DIAGNOSIS 1: Atrial Fibrillation / 427 31  DIAGNOSIS 2: Long-term (current) use of anticoagulants / V58 61  INR RANGE: 2 - 3  INR GOAL: 2 5  TREATMENT START DATE:   TREATMENT END DATE:   NEXT VISIT:     VISIT RESULTS  ENCOUNTER NUMBER:   TEST LOCATION: [facility]  TEST TYPE: Outside Lab (Venipuncture)  VISIT TYPE:   CURRENT INR: 2 1 PROTIME:   SPECIMEN COL AND RPT DATE: 5/18/2017 10:00 AM  EDT  VITAL SIGNS  PULSE:  BP: / WEIGHT:  HEIGHT:  TEMP:   CURRENT ANTICOAGULANT DOSING SCHEDULE  DOSE SIZE: 5mg    ANTICOAGULANT TYPE: COUMADIN  DOSING REGIMEN  Sun       Mon Tues Wed Thurs Fri       Sat  Total/Wk  2         1         2         1         2         1         2         11  PATIENT MEDICATION INSTRUCTION: Yes  PATIENT NUTRITIONAL COUNSELING: No  PATIENT BRUISING INSTRUCTION: No  LAST EDUCATION DATE:   PREVIOUS VISIT INFORMATION  VISITDATE  INRGoal INR   Sun    Mon Tues Wed Thurs Fri    Sat  Total/wk  5/18/2017   2 5     2 1   2      1      2      1      2      1      2  11  5/11/2017   2 5     3 47  2      1      2      1      2      1      2  11  4/5/2017    2 5     2 67  2      1      2      1      2      1      2  11  3/3/2017    2 5     2 12  2      1      2      1      2      1      2  11  ADDITIONAL PREVIOUS VISIT INFORMATION  VISITDATE   PRIMARY RX               DOSE      CrCl  5/18/2017   COUMADIN                 5mg  5/11/2017   COUMADIN                 5mg  4/5/2017    COUMADIN                 5mg  3/3/2017    COUMADIN 5mg  OTHER CURRENT MEDICATIONS:  COUMADIN  PROGRESS NOTES: PER DR Nayana Friedman SAME DOSE RECHECK 4 WEEKS, SPOKE WITH PT  PATIENT INSTRUCTIONS: SEE PROGRESS NOTE  TEST LOCATION: [facility]), , ,   INBOUND LAB DATA:  Lab       Lab Value Col Date                 Rpt Date                 Lab  Reference Range  Electronically signed by: Karen Traylor on 5/22/2017 10:00 AM EDT

## 2018-01-11 NOTE — PROGRESS NOTES
REPORT NAME: Patient Visit Summary Report   VISIT DATE: 1/15/2016  VISIT TIME: 2:45 PM EST  PATIENT NAME: Kendrick Mahajan  MEDICAL RECORD NUMBER: [id number]  SOCIAL SECURITY NUMBER:   YOB: 1934  AGE: 80  REFERRING PHYSICIAN: Desirae Hargrove  SUPERVISING CLINICIAN: Desirae Hargrove  HEALTH CARE PROFESSIONAL: Birgit Carroll   PATIENT HOME ADDRESS: [facility] [address], [address] [facility] PHONE: [phone]  DIAGNOSIS 1: Atrial Fibrillation / 427 31  DIAGNOSIS 2: Long-term (current) use of anticoagulants / V58 61  DIAGNOSIS 3:   DIAGNOSIS 4:   INR RANGE: 2 - 3  INR GOAL: 2 5  TREATMENT START DATE:   TREATMENT END DATE:   NEXT VISIT:       VISIT RESULTS   ENCOUNTER NUMBER:   TEST LOCATION: [facility]  TEST TYPE: Outside Lab (Venipuncture)  VISIT TYPE:   CURRENT INR: 2 1 PROTIME:   SPECIMEN COL AND RPT DATE: 1/15/2016 2:45 PM  EST    VITAL SIGNS  PULSE:  B/P:  WEIGHT:  HEIGHT:  TEMP:     CURRENT ANTICOAGULANT DOSING SCHEDULE  DOSE SIZE: 5mg    ANTICOAGULANT TYPE: COUMADIN  DOSING REGIMEN  Sun       Mon Tues Wed Thurs Fri       Sat  Total/Wk  2         2         1         2         2         1         2         12    PATIENT MEDICATION INSTRUCTION: Yes  PATIENT NUTRITIONAL COUNSELING: No  PATIENT BRUISING INSTRUCTION: No      LAST EDUCATION DATE:       PREVIOUS VISIT INFORMATION  VISITDATE   INR Goal  INR   Sun     Mon Tues Wed Thurs   Fri  Sat     Total/wk  1/15/2016   2 5       2 1   2       2       1       2       2       1  2       12  12/17/2015  2 5       2 27  2       2       1       2       2       1  2       12  11/20/2015  2 5       2 57  2       2       1       2       2       1  2       12  10/23/2015  2 5       2 35  2       2       1       2       2       1  2       12    ADDITIONAL PREVIOUS VISIT INFORMATION  VISITDATE   PRIMARY RX               DOSE      CrCl  1/15/2016   COUMADIN                 5mg                 12/17/2015 COUMADIN                 5mg                 11/20/2015  COUMADIN                 5mg                 10/23/2015  COUMADIN                 5mg                     OTHER CURRENT MEDICATIONS: COUMADIN      PROGRESS NOTES: PER AS/PA SAME DOSE RECHECK 3 WEEKS, SPOKE WITH PT    PATIENT INSTRUCTIONS: SEE PROGRESS NOTE    TEST LOCATION: [facility])    Electronically signed by: Galindo Aquino  on 1/15/2016 at 2:45 PM EST

## 2018-01-12 NOTE — PROGRESS NOTES
REPORT NAME: Patient Visit Summary Report   VISIT DATE: 9/28/2016  VISIT TIME: 9:26 AM EDT  PATIENT NAME: Herman Patel  MEDICAL RECORD NUMBER: [id number]  SOCIAL SECURITY NUMBER:    YOB: 1934  AGE: 80  REFERRING PHYSICIAN: Caitlni Vann  SUPERVISING CLINICIAN: Caitlin Vann  HEALTH CARE PROFESSIONAL: Ana Laura Eaton   PATIENT HOME ADDRESS: [facility] [address], [facility] [address] PHONE:  [phone]  DIAGNOSIS 1: Atrial Fibrillation / 427 31  DIAGNOSIS 2: Long-term (current) use of anticoagulants / V58 61  DIAGNOSIS 3:   DIAGNOSIS 4:   INR RANGE: 2 - 3  INR GOAL: 2 5  TREATMENT START DATE:   TREATMENT END DATE:    NEXT VISIT:       VISIT RESULTS   ENCOUNTER NUMBER:   TEST LOCATION: [facility]  TEST TYPE: Outside Lab (Venipuncture)  VISIT TYPE:   CURRENT INR: 3 19 PROTIME:   SPECIMEN COL AND RPT DATE: 9/28/2016 9:26 AM  EDT     VITAL SIGNS  PULSE:  B/P:  WEIGHT:  HEIGHT:  TEMP:     CURRENT ANTICOAGULANT DOSING SCHEDULE  DOSE SIZE: 5mg    ANTICOAGULANT TYPE: COUMADIN  DOSING REGIMEN  Sun       Mon Tues Wed Thurs Fri       Sat  Total/Wk   2         1         2         1         2         1         2         11    PATIENT MEDICATION INSTRUCTION: Yes  PATIENT NUTRITIONAL COUNSELING: No  PATIENT BRUISING INSTRUCTION: No      LAST EDUCATION DATE:       PREVIOUS VISIT  INFORMATION  VISITDATE   INR Goal  INR   Sun     Mon Tues Wed Thurs Fri  Sat     Total/wk  9/28/2016   2 5       3 19  2       1       2       1       2       1  2       11  9/14/2016   2 5       3 57  2       1       2        1       2       1  2       11  8/24/2016   2 5       3 53  2       2       1       2       2       1  2       12  8/3/2016    2 5       3 31  2       2       1       2       2       1  2       12    ADDITIONAL PREVIOUS VISIT INFORMATION   VISITDATE   PRIMARY RX               DOSE      CrCl  9/28/2016   COUMADIN                 5mg 9/14/2016   COUMADIN                 5mg                 8/24/2016   COUMADIN                 5mg                 8/3/2016    COUMADIN                  5mg                     OTHER CURRENT MEDICATIONS: COUMADIN      PROGRESS NOTES: PER AS/PA SAME DOSE RECHECK 3 WEEKS, SPOKE WITH PT    PATIENT INSTRUCTIONS: SEE PROGRESS NOTE    TEST LOCATION: [facility]   [facility])     Electronically signed by: Steve Murphy  on 9/29/2016 at 9:26 AM EDT              Electronically signed Perico CHAVEZ    Oct  7 2016  2:29PM EST

## 2018-01-12 NOTE — PROGRESS NOTES
REPORT NAME: Patient Visit Summary Report   VISIT DATE: 8/24/2016  VISIT TIME: 4:26 PM EDT  PATIENT NAME: Scott Perez  MEDICAL RECORD NUMBER: [id number]  SOCIAL SECURITY NUMBER:    YOB: 1934  AGE: 80  REFERRING PHYSICIAN: Shine Ordaz  SUPERVISING CLINICIAN: Shine Ordaz  HEALTH CARE PROFESSIONAL: Jacqueline Burns   PATIENT HOME ADDRESS: [place] [address], [facility] [address] PHONE:  [phone]  DIAGNOSIS 1: Atrial Fibrillation / 427 31  DIAGNOSIS 2: Long-term (current) use of anticoagulants / V58 61  DIAGNOSIS 3:   DIAGNOSIS 4:   INR RANGE: 2 - 3  INR GOAL: 2 5  TREATMENT START DATE:   TREATMENT END DATE:    NEXT VISIT:       VISIT RESULTS   ENCOUNTER NUMBER:   TEST LOCATION: [facility]  TEST TYPE: Outside Lab (Venipuncture)  VISIT TYPE:   CURRENT INR: 3 53 PROTIME:   SPECIMEN COL AND RPT DATE: 8/24/2016 4:26 PM  EDT     VITAL SIGNS  PULSE:  B/P:  WEIGHT:  HEIGHT:  TEMP:     CURRENT ANTICOAGULANT DOSING SCHEDULE  DOSE SIZE: 5mg    ANTICOAGULANT TYPE: COUMADIN  DOSING REGIMEN  Sun       Mon Tues Wed Thurs Fri       Sat  Total/Wk   2         2         1         2         2         1         2         12    PATIENT MEDICATION INSTRUCTION: Yes  PATIENT NUTRITIONAL COUNSELING: No  PATIENT BRUISING INSTRUCTION: No      LAST EDUCATION DATE:       PREVIOUS VISIT  INFORMATION  VISITDATE   INR Goal  INR   Sun     Mon     Tues    Wed     Thurs   Fri  Sat     Total/wk  8/24/2016   2 5       3 53  2       2       1       2       2       1  2       12  8/3/2016    2 5       3 31  2       2       1        2       2       1  2       12  7/13/2016   2 5       3 07  2       2       1       2       2       1  2       12  6/17/2016   2 5       2 79  2       2       1       2       2       1  2       12    ADDITIONAL PREVIOUS VISIT INFORMATION   VISITDATE   PRIMARY RX               DOSE      CrCl  8/24/2016   COUMADIN                 5mg 8/3/2016    COUMADIN                 5mg                 7/13/2016   COUMADIN                 5mg                 6/17/2016   COUMADIN                  5mg                     OTHER CURRENT MEDICATIONS: COUMADIN      PROGRESS NOTES: PER AS/PA SAME DOSE RECHECK 3 WEEKS, SPOKE WITH PT    PATIENT INSTRUCTIONS: SEE PROGRESS NOTE    TEST LOCATION: [facility]   [facility])     Electronically signed by: Jennifer Castro  on 8/24/2016 at 4:26 PM EDT            Electronically signed by:Ava Yañez  Aug 25 2016 10:22AM EST        Electronically signed Zurdo CHAVEZ    Jun 8 2017  5:46PM EST

## 2018-01-12 NOTE — PROGRESS NOTES
REPORT NAME: Progress Notes Report  VISIT DATE: 4/5/2017  VISIT TIME: 4:07 PM EDT  PATIENT NAME: Chayito Barrios  MEDICAL RECORD NUMBER: [id number]  YOB: 1934  AGE: 80  REFERRING PHYSICIAN: Bernardo Rivera  SUPERVISING CLINICIAN: Arpita Oliveira CARE PROVIDER: Dustin Jefferson   PATIENT HOME ADDRESS: [address], [facility] [address] PHONE: [phone]  SOCIAL SECURITY NUMBER:   DIAGNOSIS 1: Atrial Fibrillation / 427 31  DIAGNOSIS 2: Long-term (current) use of anticoagulants / V58 61  INR RANGE: 2 - 3  INR GOAL: 2 5  TREATMENT START DATE:   TREATMENT END DATE:   NEXT VISIT:     VISIT RESULTS  ENCOUNTER NUMBER:   TEST LOCATION: [facility])  TEST TYPE: Outside Lab (Venipuncture)  VISIT TYPE:   CURRENT INR: 2 67 PROTIME:   SPECIMEN COL AND RPT DATE: 4/5/2017 4:07 PM  EDT  VITAL SIGNS  PULSE:  BP: / WEIGHT:  HEIGHT:  TEMP:   CURRENT ANTICOAGULANT DOSING SCHEDULE  DOSE SIZE: 5mg    ANTICOAGULANT TYPE: COUMADIN  DOSING REGIMEN  Sun       Mon Tues Wed Thurs Fri       Sat  Total/Wk  2         1         2         1         2         1         2         11  PATIENT MEDICATION INSTRUCTION: Yes  PATIENT NUTRITIONAL COUNSELING: No  PATIENT BRUISING INSTRUCTION: No  LAST EDUCATION DATE:   PREVIOUS VISIT INFORMATION  VISITDATE  INRGoal INR   Sun    Mon Tues Wed Thurs Fri    Sat  Total/wk  4/5/2017    2 5     2 67  2      1      2      1      2      1      2  11  3/3/2017    2 5     2 12  2      1      2      1      2      1      2  11  1/16/2017   2 5     2 89  2      1      2      1      2      1      2  11  11/25/2016  2 5     2 89  2      1      2      1      2      1      2  11  ADDITIONAL PREVIOUS VISIT INFORMATION  VISITDATE   PRIMARY RX               DOSE      CrCl  4/5/2017    COUMADIN                 5mg  3/3/2017    COUMADIN                 5mg  1/16/2017   COUMADIN                 5mg  11/25/2016  COUMADIN 5mg  OTHER CURRENT MEDICATIONS:  COUMADIN  PROGRESS NOTES: PER SS/NP SAME DOSE RECHECK 4 WEEKS, SPOKE WITH PT  PATIENT INSTRUCTIONS: SEE PROGRESS NOTE  TEST LOCATION: [facility]   [facility]), , ,   INBOUND LAB DATA:  Lab       Lab Value Col Date                 Rpt Date                 Lab  Reference Range  Electronically signed by: Mar Moe  on 4/5/2017 4:07 PM EDT

## 2018-01-13 VITALS
HEIGHT: 63 IN | DIASTOLIC BLOOD PRESSURE: 62 MMHG | HEART RATE: 64 BPM | BODY MASS INDEX: 23.12 KG/M2 | SYSTOLIC BLOOD PRESSURE: 124 MMHG | WEIGHT: 130.5 LBS

## 2018-01-13 NOTE — PROGRESS NOTES
REPORT NAME: Patient Visit Summary Report   VISIT DATE: 3/3/2017  VISIT TIME: 8:54 AM EST  PATIENT NAME: Mark Leong  MEDICAL RECORD NUMBER: [id number]  SOCIAL SECURITY NUMBER:   YOB: 1934  AGE: 80  REFERRING PHYSICIAN: Johana Rain  SUPERVISING CLINICIAN: Johana Rain  HEALTH CARE PROFESSIONAL: Bobby Officer   PATIENT HOME ADDRESS: [place] [address], [facility] [address] PHONE: [phone]  DIAGNOSIS 1: Atrial Fibrillation / 427 31  DIAGNOSIS 2: Long-term (current) use of anticoagulants / V58 61  DIAGNOSIS 3:   DIAGNOSIS 4:   INR RANGE: 2 - 3  INR GOAL: 2 5  TREATMENT START DATE:   TREATMENT END DATE:   NEXT VISIT:       VISIT RESULTS   ENCOUNTER NUMBER:   TEST LOCATION: [facility]  TEST TYPE: Outside Lab (Venipuncture)  VISIT TYPE:   CURRENT INR: 2 12 PROTIME:   SPECIMEN COL AND RPT DATE: 3/3/2017 8:54 AM  EST    VITAL SIGNS  PULSE:  B/P:  WEIGHT:  HEIGHT:  TEMP:     CURRENT ANTICOAGULANT DOSING SCHEDULE  DOSE SIZE: 5mg    ANTICOAGULANT TYPE: COUMADIN  DOSING REGIMEN  Sun       Mon Tues Wed Thurs Fri       Sat  Total/Wk  2         1         2         1         2         1         2         11    PATIENT MEDICATION INSTRUCTION: Yes  PATIENT NUTRITIONAL COUNSELING: No  PATIENT BRUISING INSTRUCTION: No      LAST EDUCATION DATE:       PREVIOUS VISIT INFORMATION  VISITDATE   INR Goal  INR   Sun     Mon Tues Wed Thurs   Fri  Sat     Total/wk  3/3/2017    2 5       2 12  2       1       2       1       2       1  2       11  1/16/2017   2 5       2 89  2       1       2       1       2       1  2       11  11/25/2016  2 5       2 89  2       1       2       1       2       1  2       11  10/24/2016  2 5       2 67  2       1       2       1       2       1  2       11    ADDITIONAL PREVIOUS VISIT INFORMATION  VISITDATE   PRIMARY RX               DOSE      CrCl  3/3/2017    COUMADIN                 5mg                 1/16/2017 COUMADIN                 5mg                 11/25/2016  COUMADIN                 5mg                 10/24/2016  COUMADIN                 5mg                     OTHER CURRENT MEDICATIONS: COUMADIN      PROGRESS NOTES: PER SS/NP SAME DOSE RECHECK 4 WEEKS, SPOKE WITH PT    PATIENT INSTRUCTIONS: SEE PROGRESS NOTE    TEST LOCATION: [facility])    Electronically signed by: Ivette Hernández  on 3/6/2017 at 8:54 AM EST

## 2018-01-13 NOTE — PROGRESS NOTES
REPORT NAME: Progress Notes Report  VISIT DATE: 8/21/2017  VISIT TIME: 1:57 PM EDT  PATIENT NAME: Lynne Fallon  MEDICAL RECORD NUMBER: [id number]  YOB: 1934  AGE: 80  REFERRING PHYSICIAN: Riana Rangel  SUPERVISING CLINICIAN: [facility] CARE PROVIDER: Steve Murphy  PATIENT HOME ADDRESS: [place] [address],  [address] PHONE: [phone]  SOCIAL SECURITY NUMBER:   DIAGNOSIS 1: Atrial Fibrillation / 427 31  DIAGNOSIS 2: Long-term (current) use of anticoagulants / V58 61  INR RANGE: 2 - 3  INR GOAL: 2 5  TREATMENT START DATE:   TREATMENT END DATE:   NEXT VISIT:     VISIT RESULTS  ENCOUNTER NUMBER:   TEST LOCATION: [facility]  TEST TYPE: Outside Lab (Venipuncture)  VISIT TYPE:   CURRENT INR: 2 56 PROTIME:   SPECIMEN COL AND RPT DATE: 8/21/2017 1:57 PM  EDT  VITAL SIGNS  PULSE:  BP: / WEIGHT:  HEIGHT:  TEMP:   CURRENT ANTICOAGULANT DOSING SCHEDULE  DOSE SIZE: 5mg    ANTICOAGULANT TYPE: COUMADIN  DOSING REGIMEN  Sun       Mon Tues Wed Thurs Fri       Sat  Total/Wk  2         1         1         2         1         1         2         10  PATIENT MEDICATION INSTRUCTION: Yes  PATIENT NUTRITIONAL COUNSELING: No  PATIENT BRUISING INSTRUCTION: No  LAST EDUCATION DATE:   PREVIOUS VISIT INFORMATION  VISITDATE  INRGoal INR   Sun    Mon    Tues   Wed    Thurs  Fri    Sat  Total/wk  8/21/2017   2 5     2 56  2      1      1      2      1      1      2  10  8/2/2017    2 5     2 72  2      1      1      2      1      1      2  10  7/25/2017   2 5     3 7   2      1      1      2      1      1      2  10  6/22/2017   2 5     2 66  2      1      2      1      2      1      2  11  ADDITIONAL PREVIOUS VISIT INFORMATION  VISITDATE   PRIMARY RX               DOSE      CrCl  8/21/2017   COUMADIN                 5mg  8/2/2017    COUMADIN                 5mg  7/25/2017   COUMADIN                 5mg  6/22/2017   COUMADIN 5mg  OTHER CURRENT MEDICATIONS:  COUMADIN  PROGRESS NOTES: PER DR Raj Frye SAME DOSE RECHECK 4 WEEKS, SPOKE WITH PT  PATIENT INSTRUCTIONS: SEE PROGRESS NOTE  TEST LOCATION: [facility]), , ,   INBOUND LAB DATA:  Lab       Lab Value Col Date                 Rpt Date                 Lab  Reference Range  Electronically signed by: Avelina Wilson on 8/22/2017 1:57 PM EDT

## 2018-01-13 NOTE — PROGRESS NOTES
REPORT NAME: Patient Visit Summary Report   VISIT DATE: 5/20/2016  VISIT TIME: 9:03 AM EDT  PATIENT NAME: Noemy Ayala  MEDICAL RECORD NUMBER: [id number]  SOCIAL SECURITY NUMBER:    YOB: 1934  AGE: 80  REFERRING PHYSICIAN: Yun Blanca  SUPERVISING CLINICIAN: Yun Blanca  HEALTH CARE PROFESSIONAL: Waltre Rasheed   PATIENT HOME ADDRESS: [place] [address], [address] PHONE:  [phone]  DIAGNOSIS 1: Atrial Fibrillation / 427 31  DIAGNOSIS 2: Long-term (current) use of anticoagulants / V58 61  DIAGNOSIS 3:   DIAGNOSIS 4:   INR RANGE: 2 - 3  INR GOAL: 2 5  TREATMENT START DATE:   TREATMENT END DATE:    NEXT VISIT:       VISIT RESULTS   ENCOUNTER NUMBER:   TEST LOCATION: [facility]  TEST TYPE: Outside Lab (Venipuncture)  VISIT TYPE:   CURRENT INR: 3 12 PROTIME:   SPECIMEN COL AND RPT DATE: 5/20/2016 9:03 AM  EDT     VITAL SIGNS  PULSE:  B/P:  WEIGHT:  HEIGHT:  TEMP:     CURRENT ANTICOAGULANT DOSING SCHEDULE  DOSE SIZE: 5mg    ANTICOAGULANT TYPE: COUMADIN  DOSING REGIMEN  Sun       Mon Tues Wed Thurs Fri       Sat  Total/Wk   2         2         1         2         2         1         2         12    PATIENT MEDICATION INSTRUCTION: Yes  PATIENT NUTRITIONAL COUNSELING: No  PATIENT BRUISING INSTRUCTION: No      LAST EDUCATION DATE:       PREVIOUS VISIT  INFORMATION  VISITDATE   INR Goal  INR   Sun     Mon     Tues Wed Thurs   Fri  Sat     Total/wk  5/20/2016   2 5       3 12  2       2       1       2       2       1  2       12  4/14/2016   2 5       3 09  2       2       1        2       2       1  2       12  2/17/2016   2 5       2 68  2       2       1       2       2       1  2       12  1/15/2016   2 5       2 1   2       2       1       2       2       1  2       12    ADDITIONAL PREVIOUS VISIT INFORMATION   VISITDATE   PRIMARY RX               DOSE      CrCl  5/20/2016   COUMADIN                 5mg 4/14/2016   COUMADIN                 5mg                 2/17/2016   COUMADIN                 5mg                 1/15/2016   COUMADIN                  5mg                     OTHER CURRENT MEDICATIONS: COUMADIN      PROGRESS NOTES: PER SS/NP SAME DOSE RECHECK 3 WEEKS, SPOKE WITH PT    PATIENT INSTRUCTIONS: SEE PROGRESS NOTE    TEST LOCATION: [facility]   [facility])     Electronically signed by: Tristan Olivo  on 5/23/2016 at 9:03 AM EDT              Electronically signed Dorene CHAVEZ    Troy 15 2017  5:00PM EST

## 2018-01-13 NOTE — RESULT NOTES
Verified Results  (1) PT WITH INR [id number] 10:49AM Margaux Glover Order Number: [id number]     Test Name Result Flag Reference   INR 2 12 H 0 86-1 16   PT 23 5 seconds H 12 0-14 3

## 2018-01-14 VITALS
HEART RATE: 64 BPM | BODY MASS INDEX: 24.14 KG/M2 | SYSTOLIC BLOOD PRESSURE: 142 MMHG | WEIGHT: 136.25 LBS | DIASTOLIC BLOOD PRESSURE: 70 MMHG | HEIGHT: 63 IN

## 2018-01-14 NOTE — PROGRESS NOTES
REPORT NAME: Progress Notes Report  VISIT DATE: 9/27/2017  VISIT TIME: 8:52 AM EDT  PATIENT NAME: Elis ZHOU  MEDICAL RECORD NUMBER: [id number]  YOB: 1934  AGE: 80  REFERRING PHYSICIAN: Ra Clemente  SUPERVISING CLINICIAN: Arpita Oliveira CARE PROVIDER: Mary Sher  PATIENT HOME ADDRESS: [address], [address] PHONE: [phone]  SOCIAL SECURITY NUMBER:   DIAGNOSIS 1: Atrial Fibrillation / 427 31  DIAGNOSIS 2: Long-term (current) use of anticoagulants / V58 61  INR RANGE: 2 - 3  INR GOAL: 2 5  TREATMENT START DATE:   TREATMENT END DATE:   NEXT VISIT:     VISIT RESULTS  ENCOUNTER NUMBER:   TEST LOCATION: [facility]  TEST TYPE: Outside Lab (Venipuncture)  VISIT TYPE:   CURRENT INR: 2 48 PROTIME:   SPECIMEN COL AND RPT DATE: 9/27/2017 8:52 AM  EDT  VITAL SIGNS  PULSE:  BP: / WEIGHT:  HEIGHT:  TEMP:   CURRENT ANTICOAGULANT DOSING SCHEDULE  DOSE SIZE: 5mg    ANTICOAGULANT TYPE: COUMADIN  DOSING REGIMEN  Sun       Mon Tues Wed Thurs Fri       Sat  Total/Wk  2         1         1         2         1         1         2         10  PATIENT MEDICATION INSTRUCTION: Yes  PATIENT NUTRITIONAL COUNSELING: No  PATIENT BRUISING INSTRUCTION: No  LAST EDUCATION DATE:   PREVIOUS VISIT INFORMATION  VISITDATE  INRGoal INR   Sun    Mon    Tues   Wed    Thurs  Fri    Sat  Total/wk  9/27/2017   2 5     2 48  2      1      1      2      1      1      2  10  8/21/2017   2 5     2 56  2      1      1      2      1      1      2  10  8/2/2017    2 5     2 72  2      1      1      2      1      1      2  10  7/25/2017   2 5     3 7   2      1      1      2      1      1      2  10  ADDITIONAL PREVIOUS VISIT INFORMATION  VISITDATE   PRIMARY RX               DOSE      CrCl  9/27/2017   COUMADIN                 5mg  8/21/2017   COUMADIN                 5mg  8/2/2017    COUMADIN                 5mg  7/25/2017   COUMADIN 5mg  OTHER CURRENT MEDICATIONS:  COUMADIN  PROGRESS NOTES: PER DR Enid Garcia SAME DOSE RECHECK 4 WEEKS, SPOKE WITH PT  PATIENT INSTRUCTIONS: SEE PROGRESS NOTE  TEST LOCATION: [facility] [place]), , ,   INBOUND LAB DATA:  Lab       Lab Value Col Date                 Rpt Date                 Lab  Reference Range  Electronically signed by: Nadira Jimenez on 9/28/2017 8:52 AM EDT

## 2018-01-14 NOTE — RESULT NOTES
Verified Results  (1) PT WITH INR [id number] 09:52AM Auther Rico Order Number: [id number]     Test Name Result Flag Reference   INR 2 10 H 0 86-1 16   PT 23 8 seconds H 12 1-14 4

## 2018-01-15 NOTE — PROGRESS NOTES
REPORT NAME: Patient Visit Summary Report   VISIT DATE: 7/13/2016  VISIT TIME: 9:01 AM EDT  PATIENT NAME: Penny Dorsey  MEDICAL RECORD NUMBER: [id number]  SOCIAL SECURITY NUMBER:    YOB: 1934  AGE: 80  REFERRING PHYSICIAN: Jaylene Concepcion  SUPERVISING CLINICIAN: Jaylene Concepcion  HEALTH CARE PROFESSIONAL: Nadira Jimenez   PATIENT HOME ADDRESS: [place] [address], [facility] [address] PHONE:  [phone]  DIAGNOSIS 1: Atrial Fibrillation / 427 31  DIAGNOSIS 2: Long-term (current) use of anticoagulants / V58 61  DIAGNOSIS 3:   DIAGNOSIS 4:   INR RANGE: 2 - 3  INR GOAL: 2 5  TREATMENT START DATE:   TREATMENT END DATE:    NEXT VISIT:       VISIT RESULTS   ENCOUNTER NUMBER:   TEST LOCATION: [facility]  TEST TYPE: Outside Lab (Venipuncture)  VISIT TYPE:   CURRENT INR: 3 07 PROTIME:   SPECIMEN COL AND RPT DATE: 7/13/2016 9:01 AM  EDT     VITAL SIGNS  PULSE:  B/P:  WEIGHT:  HEIGHT:  TEMP:     CURRENT ANTICOAGULANT DOSING SCHEDULE  DOSE SIZE: 5mg    ANTICOAGULANT TYPE: COUMADIN  DOSING REGIMEN  Sun       Mon Tues Wed Thurs Fri       Sat  Total/Wk   2         2         1         2         2         1         2         12    PATIENT MEDICATION INSTRUCTION: Yes  PATIENT NUTRITIONAL COUNSELING: No  PATIENT BRUISING INSTRUCTION: No      LAST EDUCATION DATE:       PREVIOUS VISIT  INFORMATION  VISITDATE   INR Goal  INR   Sun     Mon Tues Wed Thurs Fri  Sat     Total/wk  7/13/2016   2 5       3 07  2       2       1       2       2       1  2       12  6/17/2016   2 5       2 79  2       2       1        2       2       1  2       12  5/20/2016   2 5       3 12  2       2       1       2       2       1  2       12  4/14/2016   2 5       3 09  2       2       1       2       2       1  2       12    ADDITIONAL PREVIOUS VISIT INFORMATION   VISITDATE   PRIMARY RX               DOSE      CrCl  7/13/2016   COUMADIN                 5mg 6/17/2016   COUMADIN                 5mg                 5/20/2016   COUMADIN                 5mg                 4/14/2016   COUMADIN                  5mg                     OTHER CURRENT MEDICATIONS: COUMADIN      PROGRESS NOTES: PER SS/NP SAME DOSE RECHECK 4 WEEKS, SPOKE WITH PT    PATIENT INSTRUCTIONS: SEE PROGRESS NOTE    TEST LOCATION: [facility])     Electronically signed by: Luke Rangel  on 7/14/2016 at 9:01 AM EDT              Electronically signed Buddy CHAVEZ    Jun 13 2017  4:50PM EST

## 2018-01-15 NOTE — RESULT NOTES
Verified Results  (1) PT WITH INR [id number] 02:14PM Sana Umana Order Number: [id number]     Order Number: [id number]     Test Name Result Flag Reference   INR 2 10 H 0 86-1 16   PT 22 6 seconds H 11 8-14 1

## 2018-01-16 NOTE — RESULT NOTES
Verified Results  (1) PT WITH INR [id number] 11:14AM Sidra Jones    Order Number: [id number]    TW Order Number: [id number]     Test Name Result Flag Reference   INR 2 68 H 0 86-1 16   PT 27 1 seconds H 11 8-14 1       Plan  Hypertension    · Quinapril HCl - 20 MG Oral Tablet; TAKE ONE AND ONE-HALF TABLETS DAILY

## 2018-01-16 NOTE — PROGRESS NOTES
REPORT NAME: Progress Notes Report  VISIT DATE: 11/3/2017  VISIT TIME: 2:59 PM EDT  PATIENT NAME: Ld Michel  MEDICAL RECORD NUMBER: [id number]  YOB: 1934  AGE: 80  REFERRING PHYSICIAN: Annabelle Maldonado  SUPERVISING CLINICIAN: Arpita Oliveira CARE PROVIDER: Tai Smith  PATIENT HOME ADDRESS: [place] [address], [facility] [address] PHONE: [phone]  SOCIAL SECURITY NUMBER:   DIAGNOSIS 1: Atrial Fibrillation / 427 31  DIAGNOSIS 2: Long-term (current) use of anticoagulants / V58 61  INR RANGE: 2 - 3  INR GOAL: 2 5  TREATMENT START DATE:   TREATMENT END DATE:   NEXT VISIT:     VISIT RESULTS  ENCOUNTER NUMBER:   TEST LOCATION: [facility]  TEST TYPE: Outside Lab (Venipuncture)  VISIT TYPE:   CURRENT INR: 2 35 PROTIME:   SPECIMEN COL AND RPT DATE: 11/3/2017 2:59 PM  EDT  VITAL SIGNS  PULSE:  BP: / WEIGHT:  HEIGHT:  TEMP:   CURRENT ANTICOAGULANT DOSING SCHEDULE  DOSE SIZE: 5mg    ANTICOAGULANT TYPE: COUMADIN  DOSING REGIMEN  Sun       Mon Tues Wed Thurs Fri       Sat  Total/Wk  2         1         1         2         1         1         2         10  PATIENT MEDICATION INSTRUCTION: Yes  PATIENT NUTRITIONAL COUNSELING: No  PATIENT BRUISING INSTRUCTION: No  LAST EDUCATION DATE:   PREVIOUS VISIT INFORMATION  VISITDATE  INRGoal INR   Sun    Mon Tues Wed Thurs Fri    Sat  Total/wk  11/3/2017   2 5     2 35  2      1      1      2      1      1      2  10  9/27/2017   2 5     2 48  2      1      1      2      1      1      2  10  8/21/2017   2 5     2 56  2      1      1      2      1      1      2  10  8/2/2017    2 5     2 72  2      1      1      2      1      1      2  10  ADDITIONAL PREVIOUS VISIT INFORMATION  VISITDATE   PRIMARY RX               DOSE      CrCl  11/3/2017   COUMADIN                 5mg  9/27/2017   COUMADIN                 5mg  8/21/2017   COUMADIN                 5mg  8/2/2017    COUMADIN 5mg  OTHER CURRENT MEDICATIONS:  COUMADIN  PROGRESS NOTES: PER AL/PA SAME DOSE RECHECK 4 WEEKS, SPOKE WITH PT  PATIENT INSTRUCTIONS: SEE PROGRESS NOTE  TEST LOCATION: [facility]   [facility]), , ,   INBOUND LAB DATA:  Lab       Lab Value Col Date                 Rpt Date                 Lab  Reference Range  Electronically signed by: Noelle Friend on 11/3/2017 2:59 PM EDT

## 2018-01-16 NOTE — RESULT NOTES
Verified Results  (1) PT WITH INR [id number] 10:59AM Jourdan Chambers Order Number: [id number]   Order Number: [id number]     Test Name Result Flag Reference   INR 3 57 H 0 86-1 16   PT 34 9 seconds H 12 0-14 3       Plan  Chronic obstructive pulmonary disease    · Brovana 15 MCG/2ML Inhalation Nebulization Solution; INHALE THE  CONTENTS OF 1 VIAL 2 TIMES DAILY   · Ipratropium-Albuterol 0 5-2 5 (3) MG/3ML Inhalation Solution; 0 5 mg vial via  neb qid

## 2018-01-16 NOTE — RESULT NOTES
Verified Results  (1) PT WITH INR [id number] 10:34AM Nomi Drummond     Test Name Result Flag Reference   INR 3 31 H 0 86-1 16   PT 32 9 seconds H 12 0-14 3

## 2018-01-16 NOTE — PROGRESS NOTES
REPORT NAME: Progress Notes Report  VISIT DATE: 5/11/2017  VISIT TIME: 8:56 AM EDT  PATIENT NAME: Roque ZHOU  MEDICAL RECORD NUMBER: [id number]  YOB: 1934  AGE: 80  REFERRING  PHYSICIAN: Tori Brown  SUPERVISING CLINICIAN: Arpita Oliveira CARE PROVIDER: Ivette Hernández  PATIENT HOME ADDRESS: [address], [place] [address] PHONE: [phone]  SOCIAL SECURITY NUMBER:    DIAGNOSIS 1: Atrial Fibrillation / 427 31  DIAGNOSIS 2: Long-term (current) use of anticoagulants / V58 61  INR RANGE: 2 - 3  INR GOAL: 2 5  TREATMENT START DATE:   TREATMENT END DATE:   NEXT VISIT:     VISIT RESULTS  ENCOUNTER  NUMBER:   TEST LOCATION: [facility]  TEST TYPE: Outside Lab (Venipuncture)  VISIT TYPE:   CURRENT INR: 3 47 PROTIME:   SPECIMEN COL AND RPT DATE: 5/11/2017 8:56 AM  EDT  VITAL SIGNS  PULSE:  BP: / WEIGHT:  HEIGHT:  TEMP:    CURRENT ANTICOAGULANT DOSING SCHEDULE  DOSE SIZE: 5mg    ANTICOAGULANT TYPE: COUMADIN  DOSING REGIMEN  Sun       Mon Tues Wed Thurs Fri       Sat  Total/Wk  2         1         2         1         2         1          2         11  PATIENT MEDICATION INSTRUCTION: Yes  PATIENT NUTRITIONAL COUNSELING: No  PATIENT BRUISING INSTRUCTION: No  LAST EDUCATION DATE:   PREVIOUS VISIT INFORMATION  VISITDATE  INRGoal INR   Sun    Mon Tues Wed Thurs Fri    Sat  Total/wk  5/11/2017   2 5     3 47  2      1      2      1      2      1      2  11  4/5/2017    2 5     2 67  2      1      2      1      2      1      2  11  3/3/2017    2 5     2 12  2      1      2      1      2       1      2  11  1/16/2017   2 5     2 89  2      1      2      1      2      1      2  11  ADDITIONAL PREVIOUS VISIT INFORMATION  VISITDATE   PRIMARY RX               DOSE      CrCl  5/11/2017   COUMADIN                 5mg  4/5/2017     COUMADIN                 5mg  3/3/2017    COUMADIN                 5mg  1/16/2017   COUMADIN 5mg  OTHER CURRENT MEDICATIONS:  COUMADIN  PROGRESS NOTES: PER DR PARRY HOLD 1 DAY THEN SAME DOSE RECHECK 1 WEEK, SPOKE  WITH  PT  PATIENT INSTRUCTIONS: SEE PROGRESS NOTE  TEST LOCATION: [facility]   [facility]), , ,   INBOUND LAB DATA:  Lab       Lab Value Col Date                 Rpt Date                 Lab  Reference Range  Electronically signed by: Robbin Barreto on 5/12/2017 8:56 AM EDT              Electronically signed Cedrick CHAVEZ    May 12 2017 11:38AM EST

## 2018-01-17 NOTE — PROGRESS NOTES
REPORT NAME: Progress Notes Report  VISIT DATE: 6/22/2017  VISIT TIME: 4:37 PM EDT  PATIENT NAME: Maribel Guardado  MEDICAL RECORD NUMBER: [id number]  YOB: 1934  AGE: 80  REFERRING PHYSICIAN: Jarad Guerin  SUPERVISING CLINICIAN: Arpita Oliveira CARE PROVIDER: Diamond Whyte  PATIENT HOME ADDRESS: [address], [address] [facility] PHONE: [phone]  SOCIAL SECURITY NUMBER:   DIAGNOSIS 1: Atrial Fibrillation / 427 31  DIAGNOSIS 2: Long-term (current) use of anticoagulants / V58 61  INR RANGE: 2 - 3  INR GOAL: 2 5  TREATMENT START DATE:   TREATMENT END DATE:   NEXT VISIT:     VISIT RESULTS  ENCOUNTER NUMBER:   TEST LOCATION: [facility]  TEST TYPE: Outside Lab (Venipuncture)  VISIT TYPE:   CURRENT INR: 2 66 PROTIME:   SPECIMEN COL AND RPT DATE: 6/22/2017 4:37 PM  EDT  VITAL SIGNS  PULSE:  BP: / WEIGHT:  HEIGHT:  TEMP:   CURRENT ANTICOAGULANT DOSING SCHEDULE  DOSE SIZE: 5mg    ANTICOAGULANT TYPE: COUMADIN  DOSING REGIMEN  Sun       Mon Tues Wed Thurs Fri       Sat  Total/Wk  2         1         2         1         2         1         2         11  PATIENT MEDICATION INSTRUCTION: Yes  PATIENT NUTRITIONAL COUNSELING: No  PATIENT BRUISING INSTRUCTION: No  LAST EDUCATION DATE:   PREVIOUS VISIT INFORMATION  VISITDATE  INRGoal INR   Sun    Mon Tues Wed Thurs Fri    Sat  Total/wk  6/22/2017   2 5     2 66  2      1      2      1      2      1      2  11  5/18/2017   2 5     2 1   2      1      2      1      2      1      2  11  5/11/2017   2 5     3 47  2      1      2      1      2      1      2  11  4/5/2017    2 5     2 67  2      1      2      1      2      1      2  11  ADDITIONAL PREVIOUS VISIT INFORMATION  VISITDATE   PRIMARY RX               DOSE      CrCl  6/22/2017   COUMADIN                 5mg  5/18/2017   COUMADIN                 5mg  5/11/2017   COUMADIN                 5mg  4/5/2017    COUMADIN 5mg  OTHER CURRENT MEDICATIONS:  COUMADIN  PROGRESS NOTES: PER DR Agatha nEgel SAME DOSE RECHECK 4 WEEKS, SPOKE WITH PT  PATIENT INSTRUCTIONS: SEE PROGRESS NOTE  TEST LOCATION: [facility]   [facility]), , ,   INBOUND LAB DATA:  Lab       Lab Value Col Date                 Rpt Date                 Lab  Reference Range  Electronically signed by: Buddy Zaldivar on 6/22/2017 4:37 PM EDT

## 2018-01-17 NOTE — RESULT NOTES
Verified Results  (1) PT WITH INR [id number] 10:31AM William Calderon Order Number: [id number]   Order Number: [id number]     Test Name Result Flag Reference   INR 3 07 H 0 86-1 16   PT 31 1 seconds H 12 0-14 3

## 2018-01-17 NOTE — RESULT NOTES
Verified Results  (1) PT WITH INR [id number] 10:26AM Jimbo Benoit Order Number: [id number]  TW Order Number: [id number]     Test Name Result Flag Reference   INR 3 53 H 0 86-1 16   PT 34 6 seconds H 12 0-14 3

## 2018-01-17 NOTE — PROGRESS NOTES
REPORT NAME: Patient Visit Summary Report   VISIT DATE: 2/17/2016  VISIT TIME: 9:09 AM EST  PATIENT NAME: Dayanna Benson  MEDICAL RECORD NUMBER: [id number]  SOCIAL SECURITY NUMBER:   YOB: 1934  AGE: 80  REFERRING PHYSICIAN: Steve William  SUPERVISING CLINICIAN: Steve William  HEALTH CARE PROFESSIONAL: Robbin Barreto   PATIENT HOME ADDRESS: [place] [address], [address] PHONE: [phone]  DIAGNOSIS 1: Atrial Fibrillation / 427 31  DIAGNOSIS 2: Long-term (current) use of anticoagulants / V58 61  DIAGNOSIS 3:   DIAGNOSIS 4:   INR RANGE: 2 - 3  INR GOAL: 2 5  TREATMENT START DATE:   TREATMENT END DATE:   NEXT VISIT:       VISIT RESULTS   ENCOUNTER NUMBER:   TEST LOCATION: [facility]  TEST TYPE: Outside Lab (Venipuncture)  VISIT TYPE:   CURRENT INR: 2 68 PROTIME:   SPECIMEN COL AND RPT DATE: 2/17/2016 9:09 AM  EST    VITAL SIGNS  PULSE:  B/P:  WEIGHT:  HEIGHT:  TEMP:     CURRENT ANTICOAGULANT DOSING SCHEDULE  DOSE SIZE: 5mg    ANTICOAGULANT TYPE: COUMADIN  DOSING REGIMEN  Sun       Mon Tues Wed Thurs Fri       Sat  Total/Wk  2         2         1         2         2         1         2         12    PATIENT MEDICATION INSTRUCTION: Yes  PATIENT NUTRITIONAL COUNSELING: No  PATIENT BRUISING INSTRUCTION: No      LAST EDUCATION DATE:       PREVIOUS VISIT INFORMATION  VISITDATE   INR Goal  INR   Sun     Mon Tues Wed Thurs   Fri  Sat     Total/wk  2/17/2016   2 5       2 68  2       2       1       2       2       1  2       12  1/15/2016   2 5       2 1   2       2       1       2       2       1  2       12  12/17/2015  2 5       2 27  2       2       1       2       2       1  2       12  11/20/2015  2 5       2 57  2       2       1       2       2       1  2       12    ADDITIONAL PREVIOUS VISIT INFORMATION  VISITDATE   PRIMARY RX               DOSE      CrCl  2/17/2016   COUMADIN                 5mg                 1/15/2016 COUMADIN                 5mg                 12/17/2015  COUMADIN                 5mg                 11/20/2015  COUMADIN                 5mg                     OTHER CURRENT MEDICATIONS: COUMADIN      PROGRESS NOTES: PER SS/NP SAME DOSE RECHECK 3 WEEKS, SPOKE WITH PT    PATIENT INSTRUCTIONS: SEE PROGRESS NOTE    TEST LOCATION: [facility]   [facility])    Electronically signed by: Ashley Crum  on 2/18/2016 at 9:09 AM EST

## 2018-01-17 NOTE — RESULT NOTES
Verified Results  (1) PT WITH INR [id number] 11:12AM Krystal Lui    Order Number: [id number]  TW Order Number: [id number]     Test Name Result Flag Reference   INR 3 19 H 0 86-1 16   PT 32 0 seconds H 12 0-14 3

## 2018-01-18 NOTE — RESULT NOTES
Verified Results  (1) PT WITH INR [id number] 10:44AM Fernando [place]   TW Order Number: [id number]  TW Order Number: [id number]     Test Name Result Flag Reference   INR 2 89 H 0 86-1 16   PT 29 7 seconds H 12 0-14 3

## 2018-01-18 NOTE — PROGRESS NOTES
REPORT NAME: Patient Visit Summary Report   VISIT DATE: 6/17/2016  VISIT TIME: 2:00 PM EDT  PATIENT NAME: Ramses Chauhan  MEDICAL RECORD NUMBER: [id number]  SOCIAL SECURITY NUMBER:   YOB: 1934  AGE: 80  REFERRING PHYSICIAN: Sarai Lorenzo  SUPERVISING CLINICIAN: Sarai Lorenzo  HEALTH CARE PROFESSIONAL: Watson Barber   PATIENT HOME ADDRESS: [place] [address]  PHONE: [phone]  DIAGNOSIS 1: Atrial Fibrillation / 427 31  DIAGNOSIS 2: Long-term (current) use of anticoagulants / V58 61  DIAGNOSIS 3:   DIAGNOSIS 4:   INR RANGE: 2 - 3  INR GOAL: 2 5  TREATMENT START DATE:   TREATMENT END DATE:   NEXT VISIT:       VISIT RESULTS   ENCOUNTER NUMBER:   TEST LOCATION: [facility]  TEST TYPE: Outside Lab (Venipuncture)  VISIT TYPE:   CURRENT INR: 2 79 PROTIME:   SPECIMEN COL AND RPT DATE: 6/17/2016 2:00 PM  EDT    VITAL SIGNS  PULSE:  B/P:  WEIGHT:  HEIGHT:  TEMP:     CURRENT ANTICOAGULANT DOSING SCHEDULE  DOSE SIZE: 5mg    ANTICOAGULANT TYPE: COUMADIN  DOSING REGIMEN  Sun       Mon Tues Wed Thurs Fri       Sat  Total/Wk  2         2         1         2         2         1         2         12    PATIENT MEDICATION INSTRUCTION: Yes  PATIENT NUTRITIONAL COUNSELING: No  PATIENT BRUISING INSTRUCTION: No      LAST EDUCATION DATE:       PREVIOUS VISIT INFORMATION  VISITDATE   INR Goal  INR   Sun     Mon     Tues Wed Thurs   Fri  Sat     Total/wk  6/17/2016   2 5       2 79  2       2       1       2       2       1  2       12  5/20/2016   2 5       3 12  2       2       1       2       2       1  2       12  4/14/2016   2 5       3 09  2       2       1       2       2       1  2       12  2/17/2016   2 5       2 68  2       2       1       2       2       1  2       12    ADDITIONAL PREVIOUS VISIT INFORMATION  VISITDATE   PRIMARY RX               DOSE      CrCl  6/17/2016   COUMADIN                 5mg                 5/20/2016 COUMADIN                 5mg                 4/14/2016   COUMADIN                 5mg                 2/17/2016   COUMADIN                 5mg                     OTHER CURRENT MEDICATIONS: COUMADIN      PROGRESS NOTES: PER AS/PA SAME DOSE RECHECK 3 WEEKS, LMOM    PATIENT INSTRUCTIONS: SEE PROGRESS NOTE    TEST LOCATION: [facility]   [facility])    Electronically signed by: Lexus Aleman  on 6/17/2016 at 2:00 PM EDT

## 2018-01-18 NOTE — RESULT NOTES
Verified Results  (1) PT WITH INR [id number] 10:59AM Conrad Market Order Number: [id number]     Test Name Result Flag Reference   INR 2 35 H 0 86-1 16   PT 26 0 seconds H 12 1-14 4

## 2018-01-18 NOTE — PROGRESS NOTES
REPORT NAME: Patient Visit Summary Report   VISIT DATE: 8/3/2016  VISIT TIME: 3:27 PM EDT  PATIENT NAME: Johana Storey  MEDICAL RECORD NUMBER: [id number]  SOCIAL SECURITY NUMBER:    YOB: 1934  AGE: 80  REFERRING PHYSICIAN: Ranjeet Zhang  SUPERVISING CLINICIAN: Ranjeet Zhang  HEALTH CARE PROFESSIONAL: Conrado Dent   PATIENT HOME ADDRESS: [place] [address], [address] [place] [address] PHONE:  [phone]  DIAGNOSIS 1: Atrial Fibrillation / 427 31  DIAGNOSIS 2: Long-term (current) use of anticoagulants / V58 61  DIAGNOSIS 3:   DIAGNOSIS 4:   INR RANGE: 2 - 3  INR GOAL: 2 5  TREATMENT START DATE:   TREATMENT END DATE:    NEXT VISIT:       VISIT RESULTS   ENCOUNTER NUMBER:   TEST LOCATION: [facility]  TEST TYPE: Outside Lab (Venipuncture)  VISIT TYPE:   CURRENT INR: 3 31 PROTIME:   SPECIMEN COL AND RPT DATE: 8/3/2016 3:27 PM  EDT     VITAL SIGNS  PULSE:  B/P:  WEIGHT:  HEIGHT:  TEMP:     CURRENT ANTICOAGULANT DOSING SCHEDULE  DOSE SIZE: 5mg    ANTICOAGULANT TYPE: COUMADIN  DOSING REGIMEN  Sun       Mon Tues Wed Thurs Fri       Sat  Total/Wk   2         2         1         2         2         1         2         12    PATIENT MEDICATION INSTRUCTION: Yes  PATIENT NUTRITIONAL COUNSELING: No  PATIENT BRUISING INSTRUCTION: No      LAST EDUCATION DATE:       PREVIOUS VISIT  INFORMATION  VISITDATE   INR Goal  INR   Sun     Mon Tues Wed Thurs Fri  Sat     Total/wk  8/3/2016    2 5       3 31  2       2       1       2       2       1  2       12  7/13/2016   2 5       3 07  2       2       1        2       2       1  2       12  6/17/2016   2 5       2 79  2       2       1       2       2       1  2       12  5/20/2016   2 5       3 12  2       2       1       2       2       1  2       12    ADDITIONAL PREVIOUS VISIT INFORMATION   VISITDATE   PRIMARY RX               DOSE      CrCl  8/3/2016    COUMADIN                 5mg 7/13/2016   COUMADIN                 5mg                 6/17/2016   COUMADIN                 5mg                 5/20/2016   COUMADIN                  5mg                     OTHER CURRENT MEDICATIONS: COUMADIN      PROGRESS NOTES: PER AS/PA SAME DOSE RECHECK 3 WEEKS, SPOKE WITH PT    PATIENT INSTRUCTIONS: SEE PROGRESS NOTE    TEST LOCATION: [facility]   [facility])     Electronically signed by: Anel Morfin  on 8/3/2016 at 3:27 PM EDT              Electronically signed Thania CHAVEZ    Jun 12 2017  6:44PM EST

## 2018-01-23 NOTE — PROGRESS NOTES
REPORT NAME: Progress Notes Report  VISIT DATE: 12/8/2017  VISIT TIME: 3:11 PM EST  PATIENT NAME: Josue Pearl  MEDICAL RECORD NUMBER: [id number]  YOB: 1934  AGE: 80  REFERRING PHYSICIAN: Briana Stallworth  SUPERVISING CLINICIAN: Arpita Oliveira CARE PROVIDER: Martin Rowe  PATIENT HOME ADDRESS: [address], [place] [address] PHONE: [phone]  SOCIAL SECURITY NUMBER:   DIAGNOSIS 1: Atrial Fibrillation / [id number]  DIAGNOSIS 2: Long-term (current) use of anticoagulants / V58 61  INR RANGE: 2 - 3  INR GOAL: 2 5  TREATMENT START DATE:   TREATMENT END DATE:   NEXT VISIT:     VISIT RESULTS  ENCOUNTER NUMBER:   TEST LOCATION: [facility])  TEST TYPE: Outside Lab (Venipuncture)  VISIT TYPE:   CURRENT INR: 2 85 PROTIME:   SPECIMEN COL AND RPT DATE: 12/8/2017 3:11 PM  EST  VITAL SIGNS  PULSE:  BP: / WEIGHT:  HEIGHT:  TEMP:   CURRENT ANTICOAGULANT DOSING SCHEDULE  DOSE SIZE: 5mg    ANTICOAGULANT TYPE: COUMADIN  DOSING REGIMEN  Sun       Mon Tues Wed Thurs Fri       Sat  Total/Wk  2         1         1         2         1         1         2         10  PATIENT MEDICATION INSTRUCTION: Yes  PATIENT NUTRITIONAL COUNSELING: No  PATIENT BRUISING INSTRUCTION: No  LAST EDUCATION DATE:   PREVIOUS VISIT INFORMATION  VISITDATE  INRGoal INR   Sun    Mon Tues Wed Thurs Fri    Sat  Total/wk  12/8/2017   2 5     2 85  2      1      1      2      1      1      2  10  11/3/2017   2 5     2 35  2      1      1      2      1      1      2  10  9/27/2017   2 5     2 48  2      1      1      2      1      1      2  10  8/21/2017   2 5     2 56  2      1      1      2      1      1      2  10  ADDITIONAL PREVIOUS VISIT INFORMATION  VISITDATE   PRIMARY RX               DOSE      CrCl  12/8/2017   COUMADIN                 5mg  11/3/2017   COUMADIN                 5mg  9/27/2017   COUMADIN                 5mg  8/21/2017   COUMADIN 5mg  OTHER CURRENT MEDICATIONS:  COUMADIN  PROGRESS NOTES: PER DR FLORES SAME DOSE RECHECK 2 WEEKS, SPOKE WITH PTS WIFE  PATIENT INSTRUCTIONS: SEE PROGRESS NOTE  TEST LOCATION: [facility]   [facility]), , ,   INBOUND LAB DATA:  Lab       Lab Value Col Date                 Rpt Date                 Lab  Reference Range  Electronically signed by: Glori Fothergill on 12/8/2017 3:11 PM EST

## 2018-02-09 ENCOUNTER — TELEPHONE (OUTPATIENT)
Dept: CARDIOLOGY CLINIC | Facility: CLINIC | Age: 83
End: 2018-02-09

## 2018-02-09 ENCOUNTER — TELEPHONE (OUTPATIENT)
Dept: INTERNAL MEDICINE CLINIC | Facility: CLINIC | Age: 83
End: 2018-02-09

## 2018-02-09 DIAGNOSIS — G47.00 INSOMNIA, UNSPECIFIED TYPE: Primary | ICD-10-CM

## 2018-02-09 RX ORDER — ZOLPIDEM TARTRATE 10 MG/1
TABLET ORAL
COMMUNITY
Start: 2014-04-18 | End: 2018-02-09 | Stop reason: SDUPTHER

## 2018-02-09 RX ORDER — ZOLPIDEM TARTRATE 10 MG/1
10 TABLET ORAL
Qty: 30 TABLET | Refills: 5 | OUTPATIENT
Start: 2018-02-09 | End: 2018-03-19 | Stop reason: SDUPTHER

## 2018-02-09 NOTE — TELEPHONE ENCOUNTER
PATIENT WANTS TO KNOW IF DR WILL RENEW THE ZOLPIDEM MEDICATION  WAS A PATIENT OF DR Manuela Coffman BUT HE RETIRED AND DOES NOT SEE DR Santiago Saldivar TILL 2/19/18  PLEASE ADVISE   PATIENT STATES SHE NEEDS AND ONLY HAS A FEW PILLS LEFT

## 2018-02-09 NOTE — TELEPHONE ENCOUNTER
Spoke with patient telling her this medication needs to come from the PCP she chooses and to call their office and see if they will fill it for her since she has an appointment with them

## 2018-02-17 RX ORDER — WARFARIN SODIUM 2 MG/1
1-2 TABLET ORAL DAILY
COMMUNITY
Start: 2014-04-30 | End: 2018-05-01 | Stop reason: SDUPTHER

## 2018-02-17 RX ORDER — FUROSEMIDE 20 MG/1
1 TABLET ORAL 3 TIMES DAILY
COMMUNITY
Start: 2015-09-14 | End: 2018-04-11 | Stop reason: SDUPTHER

## 2018-02-17 RX ORDER — IPRATROPIUM BROMIDE AND ALBUTEROL SULFATE 2.5; .5 MG/3ML; MG/3ML
SOLUTION RESPIRATORY (INHALATION)
COMMUNITY
Start: 2015-09-08 | End: 2018-05-14 | Stop reason: SDUPTHER

## 2018-02-17 RX ORDER — POTASSIUM CHLORIDE 20 MEQ/1
1 TABLET, EXTENDED RELEASE ORAL DAILY
COMMUNITY
Start: 2016-01-18 | End: 2018-03-19 | Stop reason: SDUPTHER

## 2018-02-17 RX ORDER — ARFORMOTEROL TARTRATE 15 UG/2ML
1 SOLUTION RESPIRATORY (INHALATION) 2 TIMES DAILY
COMMUNITY
Start: 2014-05-01 | End: 2018-07-02 | Stop reason: SDUPTHER

## 2018-02-17 RX ORDER — DIGOXIN 125 MCG
1 TABLET ORAL DAILY
COMMUNITY
Start: 2016-06-08 | End: 2018-03-13 | Stop reason: SDUPTHER

## 2018-02-17 RX ORDER — METOPROLOL SUCCINATE 50 MG/1
1 TABLET, EXTENDED RELEASE ORAL DAILY
COMMUNITY
Start: 2014-05-29 | End: 2018-02-23 | Stop reason: SDUPTHER

## 2018-02-17 RX ORDER — QUINAPRIL 20 MG/1
1.5 TABLET ORAL DAILY
COMMUNITY
Start: 2014-09-25 | End: 2018-07-12 | Stop reason: SDUPTHER

## 2018-02-17 RX ORDER — ALBUTEROL SULFATE 2.5 MG/3ML
SOLUTION RESPIRATORY (INHALATION)
COMMUNITY
Start: 2014-05-01

## 2018-02-19 ENCOUNTER — OFFICE VISIT (OUTPATIENT)
Dept: INTERNAL MEDICINE CLINIC | Facility: CLINIC | Age: 83
End: 2018-02-19
Payer: MEDICARE

## 2018-02-19 VITALS
HEART RATE: 58 BPM | HEIGHT: 59 IN | WEIGHT: 135.4 LBS | BODY MASS INDEX: 27.3 KG/M2 | SYSTOLIC BLOOD PRESSURE: 138 MMHG | DIASTOLIC BLOOD PRESSURE: 78 MMHG | OXYGEN SATURATION: 92 %

## 2018-02-19 DIAGNOSIS — M19.90 ARTHRITIS: ICD-10-CM

## 2018-02-19 DIAGNOSIS — E78.5 HYPERLIPIDEMIA, UNSPECIFIED HYPERLIPIDEMIA TYPE: Chronic | ICD-10-CM

## 2018-02-19 DIAGNOSIS — I25.10 CORONARY ARTERIOSCLEROSIS: Primary | Chronic | ICD-10-CM

## 2018-02-19 DIAGNOSIS — I27.21 PULMONARY ARTERY HYPERTENSION (HCC): ICD-10-CM

## 2018-02-19 DIAGNOSIS — I10 ESSENTIAL HYPERTENSION: Chronic | ICD-10-CM

## 2018-02-19 DIAGNOSIS — Z71.82 EXERCISE COUNSELING: ICD-10-CM

## 2018-02-19 DIAGNOSIS — J43.9 PULMONARY EMPHYSEMA, UNSPECIFIED EMPHYSEMA TYPE (HCC): Chronic | ICD-10-CM

## 2018-02-19 DIAGNOSIS — Z71.3 DIETARY COUNSELING AND SURVEILLANCE: ICD-10-CM

## 2018-02-19 DIAGNOSIS — I48.91 ATRIAL FIBRILLATION, UNSPECIFIED TYPE (HCC): Chronic | ICD-10-CM

## 2018-02-19 PROCEDURE — 99204 OFFICE O/P NEW MOD 45 MIN: CPT | Performed by: INTERNAL MEDICINE

## 2018-02-19 RX ORDER — TRAMADOL HYDROCHLORIDE 50 MG/1
50 TABLET ORAL 2 TIMES DAILY PRN
Qty: 30 TABLET | Refills: 1 | Status: SHIPPED | OUTPATIENT
Start: 2018-02-19 | End: 2018-05-22

## 2018-02-19 NOTE — PATIENT INSTRUCTIONS
Arthritis   AMBULATORY CARE:   Arthritis  is a disease that causes inflammation in one or more joints  There are many types of arthritis, such as osteoarthritis, rheumatoid arthritis, and septic arthritis  Some types cause inflammation in the joints  Other types wear away the cartilage between joints  This makes the bones of the joint rub together when you move the joint  Your symptoms may be constant, or symptoms may come and go  Arthritis often gets worse over time and can cause permanent joint damage  Common signs and symptoms of arthritis:   · Pain, swelling, or stiffness in the joint    · Limited range of motion in the joint    · Warmth or redness over the joint    · Tenderness when you touch the joint    · Stiff joints in the morning that loosen with movement    · A creaking or grinding sound when you move the joint    · Fever  Seek care immediately if:   · You have a fever and severe joint pain or swelling  · You cannot move the affected joint  · You have severe joint pain you cannot tolerate  Contact your healthcare provider if:   · Your pain or swelling does not get better with treatment  · You have questions or concerns about your condition or care  Treatment  will depend on the type of arthritis you have and if it is severe  You may need any of the following:  · Acetaminophen  decreases pain and fever  It is available without a doctor's order  Ask how much to take and how often to take it  Follow directions  Acetaminophen can cause liver damage if not taken correctly  · NSAIDs , such as ibuprofen, help decrease swelling, pain, and fever  This medicine is available with or without a doctor's order  NSAIDs can cause stomach bleeding or kidney problems in certain people  If you take blood thinner medicine, always ask your healthcare provider if NSAIDs are safe for you  Always read the medicine label and follow directions  · Steroid medicine  helps reduce swelling and pain       · Surgery may be needed to repair or replace a damaged joint  Manage arthritis:   · Rest your painful joint so it can heal   Your healthcare provider may recommend crutches or a walker if the affected joint is in a leg  · Apply ice or heat to the joint  Both can help decrease swelling and pain  Ice may also help prevent tissue damage  Use an ice pack, or put crushed ice in a plastic bag  Cover it with a towel and place it on your joint for 15 to 20 minutes every hour or as directed  You can apply heat for 20 minutes every 2 hours  Heat treatment includes hot packs or heat lamps  · Elevate your joint  Elevation helps reduce swelling and pain  Raise your joint above the level of your heart as often as you can  Prop your painful joint on pillows to keep it above your heart comfortably  · Go to physical or occupational therapy as directed  A physical therapist can teach you exercises to improve flexibility and range of motion  You may also be shown non-weight-bearing exercises that are safe for your joints, such as swimming  Exercise can help keep your joints flexible and reduce pain  An occupational therapist can help you learn to do your daily activities when your joints are stiff or sore  · Maintain a healthy weight  Extra weight puts increased pressure on your joints  Ask your healthcare provider what you should weigh  If you need to lose weight, he can help you create a weight loss program  Weight loss can help reduce pain and increase your ability to do your activities  The amount of exercise you do may vary each day, depending on your symptoms  · Wear flat or low-heeled shoes  This will help decrease pain and reduce pressure on your ankle, knee, and hip joints  · Use support devices as directed  You may be given splints to wear on your hands to help your joints rest and to decrease inflammation  While you sleep, use a pillow that is firm enough to support your neck and head    Other equipment  that may help you move and prevent falls:  · Orthotic shoes or insoles  help support your feet when you walk  · Crutches, a cane, or a walker  may help decrease your risk for falling  They also decrease stress on affected joints  · Devices to prevent falls  include raised toilet seats and bathtub bars to help you get up from sitting  Handrails can be placed in areas where you need balance and support  Follow up with your healthcare provider or rheumatologist as directed:  Write down your questions so you remember to ask them during your visits  © 2017 [facility] Information is for End User's use only and may not be sold, redistributed or otherwise used for commercial purposes  All illustrations and images included in CareNotes® are the copyrighted property of A KATHY A WILL , Steph  or Israel Bruno  The above information is an  only  It is not intended as medical advice for individual conditions or treatments  Talk to your doctor, nurse or pharmacist before following any medical regimen to see if it is safe and effective for you

## 2018-02-19 NOTE — PROGRESS NOTES
INTERNAL MEDICINE INITIAL OFFICE VISIT  [facility] [address] [place] [address]    NAME: Antonina Clay  AGE: 80 y o  SEX: female  : 1934     DATE: 2018    Assessment and Plan     Assessment  1  Coronary arteriosclerosis  2  Essential hypertension  3  Atrial fibrillation, unspecified type ([address] )  4  Pulmonary emphysema, unspecified emphysema type ([address] )  5  Pulmonary artery hypertension  6  Hyperlipidemia, unspecified hyperlipidemia type  7  Arthritis    Plan    Patient appears to be cardiovascularly stable  She denies any progressive dyspnea on exertion  She denies any recent chest pain  Her medications were reviewed with her  No changes to her chronic medications, except will add tramadol as needed for severe arthritis pain  Patient is mostly sedentary  She uses rolling walker at times  But mostly she is in wheelchair or sitting on couch  She has underlying COPD and states Alin Irma helps her  She has had no recent COPD exacerbations  She would like to see Dr Ismael Galicia again as she has not been seen since   Previous notes reviewed and she had PFTs in the past which showed mixed obstructive and restrictive disease  Labs from September were reviewed  Renal function looks good and cholesterol was excellent  Will repeat labs in 4 months before next visit  All patient's questions were answered       Orders Placed This Encounter   Procedures    Lipid panel in 4 months    CBC in 4 months    Comprehensive metabolic panel in 4 months    TSH, 3rd generation in 4 months    Ambulatory referral to Pulmonology - Dr Cinthya Hicks Medications Ordered This Visit   Medications    traMADol (ULTRAM) 50 mg tablet     Sig: Take 1 tablet (50 mg total) by mouth 2 (two) times a day as needed for moderate pain     Dispense:  30 tablet     Refill:  1     Return to office in 4 months    Chief Complaint     Chief Complaint   Patient presents with    Establish Care     SOB     History of Present Illness     Patient presents to establish care  History of HTN, HLD, COPD, Afib, pulm HTN (previous ECHO in 2013 showed mod TR/NC), CAD, chronic venous insufficiency, and previous smoking  She states her breathing is stable and she is not very active  She is mostly sedentary  No current cardiac symptoms  She has not seen pulmonary since 2015  She uses [place] which helps  She does not believe in vaccines  She has had no recent COPD exacerbations  She quit smoking years ago  She denies any worsening cough or congestion  She denies any bleeding on coumadin  She has no falls, urinary incontinence, or depression  Her only concern is arthritis pain  Sometimes it gets bad and tylenol doesn't help  The following portions of the patient's history were reviewed and updated as appropriate: allergies, current medications, past family history, past medical history, past social history, past surgical history and problem list     Review of Systems   Review of Systems   Constitutional: Negative for appetite change, chills, fatigue and fever  HENT: Negative for congestion, hearing loss, postnasal drip, rhinorrhea, sore throat, tinnitus and trouble swallowing  Eyes: Negative for pain, discharge, redness and visual disturbance  Respiratory: Positive for shortness of breath (with exertion)  Negative for cough, chest tightness and wheezing  Cardiovascular: Positive for leg swelling (Chronic venous stasis)  Negative for chest pain and palpitations  Gastrointestinal: Negative for abdominal distention, abdominal pain, blood in stool, constipation, diarrhea, nausea and vomiting  Endocrine: Negative for cold intolerance, heat intolerance, polydipsia, polyphagia and polyuria  Genitourinary: Negative for difficulty urinating, dysuria, frequency, hematuria and urgency  Musculoskeletal: Positive for arthralgias, back pain and gait problem  Negative for joint swelling, myalgias, neck pain and neck stiffness     Skin: Negative for color change and rash  Neurological: Negative for dizziness, tremors, seizures, syncope, speech difficulty, weakness, light-headedness, numbness and headaches  Hematological: Negative for adenopathy  Does not bruise/bleed easily  Psychiatric/Behavioral: Negative for agitation, behavioral problems, confusion, hallucinations, sleep disturbance and suicidal ideas  The patient is not nervous/anxious  Past Medical History     Past Medical History:   Diagnosis Date    A-fib ([place] [address] )     Anxiety     COPD (chronic obstructive pulmonary disease) ([place] [address] )     Former tobacco use     Generalized osteoarthritis     HTN (hypertension)     Hyperlipidemia     Primary pulmonary hypertension ([place] [address] )     Restrictive lung disease        Past Surgical History     Past Surgical History:   Procedure Laterality Date    HIP SURGERY         Social History   She reports that she quit smoking about 25 years ago  She has quit using smokeless tobacco  She reports that she does not drink alcohol or use drugs      Family History     Family History   Problem Relation Age of Onset    Heart disease Family        Current Medications     Current Outpatient Prescriptions:     albuterol (2 5 mg/3 mL) 0 083 % nebulizer solution, Inhale, Disp: , Rfl:     arformoterol (BROVANA) 15 mcg/2 mL, Inhale 1 vial 2 (two) times a day, Disp: , Rfl:     digoxin (LANOXIN) 0 125 mg tablet, Take 1 tablet by mouth daily, Disp: , Rfl:     furosemide (LASIX) 20 mg tablet, Take 1 tablet by mouth 3 (three) times a day, Disp: , Rfl:     ipratropium-albuterol (DUO-NEB) 0 5-2 5 mg/3 mL, Inhale, Disp: , Rfl:     metoprolol succinate (TOPROL-XL) 50 mg 24 hr tablet, Take 1 tablet by mouth daily, Disp: , Rfl:     potassium chloride (K-DUR,KLOR-CON) 20 mEq tablet, Take 1 tablet by mouth daily, Disp: , Rfl:     quinapril (ACCUPRIL) 20 mg tablet, Take 1 5 tablets by mouth daily, Disp: , Rfl:     warfarin (COUMADIN) 2 mg tablet, Take 1-2 tablets by mouth daily, Disp: , Rfl:     zolpidem (AMBIEN) 10 mg tablet, Take 1 tablet (10 mg total) by mouth daily at bedtime as needed for sleep, Disp: 30 tablet, Rfl: 5    traMADol (ULTRAM) 50 mg tablet, Take 1 tablet (50 mg total) by mouth 2 (two) times a day as needed for moderate pain, Disp: 30 tablet, Rfl: 1    Allergies   No Known Allergies    Objective   /78 (BP Location: Left arm, Patient Position: Sitting, Cuff Size: Standard)   Pulse 58   Ht 4' 10 5" (1 486 m)   Wt 61 4 kg (135 lb 6 4 oz) Comment: w/shoe  SpO2 92%   BMI 27 82 kg/m²     Physical Exam   Constitutional: She is oriented to person, place, and time  She appears well-developed and well-nourished  No distress  HENT:   Head: Normocephalic and atraumatic  Eyes: Conjunctivae and EOM are normal  Pupils are equal, round, and reactive to light  Right eye exhibits no discharge  Left eye exhibits no discharge  No scleral icterus  Neck: Normal range of motion  Neck supple  No JVD present  No tracheal deviation present  No thyromegaly present  Cardiovascular: Normal rate, normal heart sounds and intact distal pulses  An irregularly irregular rhythm present  Exam reveals no gallop and no friction rub  No murmur heard  Pulmonary/Chest: Effort normal  No stridor  No respiratory distress  She has decreased breath sounds in the right upper field, the right middle field, the right lower field, the left upper field, the left middle field and the left lower field  She has no wheezes  She has no rales  She exhibits no tenderness  Abdominal: Soft  Bowel sounds are normal  She exhibits no distension and no mass  There is no tenderness  There is no rebound and no guarding  Musculoskeletal: She exhibits edema (bilateral LE venous stasis changes with non-pitting edema)  She exhibits no tenderness or deformity  Lymphadenopathy:     She has no cervical adenopathy  Neurological: She is alert and oriented to person, place, and time   No cranial nerve deficit  She exhibits normal muscle tone  Coordination normal    Skin: Skin is warm and dry  No rash noted  She is not diaphoretic  No erythema  No pallor  Psychiatric: She has a normal mood and affect  Her behavior is normal    Vitals reviewed  Laboratory Results: I have personally reviewed the pertinent laboratory results/reports   Radiology/Other Diagnostic Testing Results: I have personally reviewed pertinent reports        Health Maintenance     Health Maintenance   Topic Date Due    DTaP,Tdap,and Td Vaccines (1 - Tdap) 06/15/1941    PNEUMOCOCCAL POLYSACCHARIDE VACCINE AGE 72 AND OVER  06/15/1999    GLAUCOMA SCREENING 67+ YR  06/15/2001    Fall Risk  02/19/2019    Depression Screening PHQ-9  02/19/2019    Urinary Incontinence Screening  02/19/2019    INFLUENZA VACCINE  Addressed     Immunization History   Administered Date(s) Administered    Influenza 10/13/2009, 10/15/2010       Sj Fraser DO  [facility]

## 2018-02-23 DIAGNOSIS — I10 ESSENTIAL HYPERTENSION: ICD-10-CM

## 2018-02-23 DIAGNOSIS — I15.9 SECONDARY HYPERTENSION: Primary | ICD-10-CM

## 2018-02-23 RX ORDER — METOPROLOL SUCCINATE 50 MG/1
50 TABLET, EXTENDED RELEASE ORAL DAILY
Qty: 90 TABLET | Refills: 2 | Status: SHIPPED | OUTPATIENT
Start: 2018-02-23 | End: 2018-10-25 | Stop reason: SDUPTHER

## 2018-03-09 ENCOUNTER — TELEPHONE (OUTPATIENT)
Dept: PULMONOLOGY | Facility: CLINIC | Age: 83
End: 2018-03-09

## 2018-03-09 ENCOUNTER — ANTICOAG VISIT (OUTPATIENT)
Dept: CARDIOLOGY CLINIC | Facility: CLINIC | Age: 83
End: 2018-03-09

## 2018-03-09 ENCOUNTER — TELEPHONE (OUTPATIENT)
Dept: INTERNAL MEDICINE CLINIC | Facility: CLINIC | Age: 83
End: 2018-03-09

## 2018-03-09 DIAGNOSIS — I48.91 ATRIAL FIBRILLATION, UNSPECIFIED TYPE (HCC): Primary | ICD-10-CM

## 2018-03-09 NOTE — TELEPHONE ENCOUNTER
Patient called from a hotel in the [address] [place] [address]  She was saying that she had a question about her medications  She left the name of the hotel - [address] [place] [address]- room [address]   She didn't leave what town she was in or a phone number-    [facility] the [address] [place] [address] but they said they didn't have a guest there by that name or in that room    I tried to call the house number we have in her chart but there was no answer there     If the patient calls back I wanted to let you know what she said in her voicemail and that I tried to call her back

## 2018-03-09 NOTE — TELEPHONE ENCOUNTER
PT STAYING  AT  A  HOTEL BECAUSE  OF  POWER SHE  IS  RUNNING  OUT  OF  RX  FOR  HER  BREATHING    WANTS  TO KNOW  IF  SHE  COULD  DOUBLE  UP  ON HER  RX IBRATROBIUN 0 5  SHE  IS  OUT  OF  [employer]     CELL PHONE  NUMBER    PT  JUST  GOT  DISCONNECTED FROM  ME  HOPEFULLY SHE  WILL CALL BACK  SAID  SHE  IS  STAYING  AT  [facility] BUT  NOT  SURE  WHAT  TOWN  THEY ARE  IN   IT  SAD   NOT  SURE  OF  PHONE  NUMBER THEY  ARE  CALLING  FROM

## 2018-03-13 ENCOUNTER — TELEPHONE (OUTPATIENT)
Dept: INTERNAL MEDICINE CLINIC | Facility: CLINIC | Age: 83
End: 2018-03-13

## 2018-03-13 DIAGNOSIS — I48.91 ATRIAL FIBRILLATION, UNSPECIFIED TYPE (HCC): Primary | Chronic | ICD-10-CM

## 2018-03-13 RX ORDER — DIGOXIN 125 MCG
125 TABLET ORAL DAILY
Qty: 90 TABLET | Refills: 3 | Status: SHIPPED | OUTPATIENT
Start: 2018-03-13 | End: 2018-03-13 | Stop reason: SDUPTHER

## 2018-03-13 RX ORDER — DIGOXIN 125 MCG
125 TABLET ORAL DAILY
Qty: 90 TABLET | Refills: 0 | Status: SHIPPED | OUTPATIENT
Start: 2018-03-13 | End: 2018-04-11 | Stop reason: SDUPTHER

## 2018-03-19 ENCOUNTER — TRANSCRIBE ORDERS (OUTPATIENT)
Dept: LAB | Facility: HOSPITAL | Age: 83
End: 2018-03-19

## 2018-03-19 DIAGNOSIS — I48.91 ATRIAL FIBRILLATION, UNSPECIFIED TYPE (HCC): Primary | ICD-10-CM

## 2018-03-19 DIAGNOSIS — I10 ESSENTIAL HYPERTENSION: ICD-10-CM

## 2018-03-19 RX ORDER — POTASSIUM CHLORIDE 20 MEQ/1
20 TABLET, EXTENDED RELEASE ORAL DAILY
Qty: 90 TABLET | Refills: 3 | Status: SHIPPED | OUTPATIENT
Start: 2018-03-19 | End: 2018-07-09 | Stop reason: SDUPTHER

## 2018-03-19 RX ORDER — ZOLPIDEM TARTRATE 5 MG/1
5 TABLET ORAL
Qty: 90 TABLET | Refills: 1 | Status: SHIPPED | OUTPATIENT
Start: 2018-03-19 | End: 2018-04-19 | Stop reason: SDUPTHER

## 2018-03-19 NOTE — TELEPHONE ENCOUNTER
Pt is asking that Zolpidem 10 mg be ordered @ [facility] ASAP  Aime Adryan She is almost out of them and SD everytime she calls [facility]    They say the RX is written by Dr Bj Cabrales and needs to be changed to Dr Tiffanie Cornelius Pls order ASAP @ [facility]

## 2018-03-28 ENCOUNTER — APPOINTMENT (OUTPATIENT)
Dept: LAB | Facility: HOSPITAL | Age: 83
End: 2018-03-28
Attending: INTERNAL MEDICINE
Payer: MEDICARE

## 2018-03-28 ENCOUNTER — ANTICOAG VISIT (OUTPATIENT)
Dept: CARDIOLOGY CLINIC | Facility: CLINIC | Age: 83
End: 2018-03-28

## 2018-03-28 LAB
INR PPP: 4.35 (ref 0.86–1.16)
PROTHROMBIN TIME: 42.4 SECONDS (ref 12.1–14.4)

## 2018-03-28 PROCEDURE — 85610 PROTHROMBIN TIME: CPT

## 2018-03-28 PROCEDURE — 36415 COLL VENOUS BLD VENIPUNCTURE: CPT

## 2018-03-28 NOTE — PROGRESS NOTES
Spoke with patient telling her to hold coumadin 3 days, then take 1 mg qd and recheck in 2 weeks per Dr Leonardo Gaston   Pt verbalized understanding and wrote instructions down

## 2018-03-29 ENCOUNTER — TELEPHONE (OUTPATIENT)
Dept: CARDIOLOGY CLINIC | Facility: CLINIC | Age: 83
End: 2018-03-29

## 2018-03-29 NOTE — TELEPHONE ENCOUNTER
Per message yesterday by Dr Luther Hassan  Patient to HOLD x3 days  1 mg daily   Recheck INR in 2 weeks  Patient verbally understands

## 2018-04-11 DIAGNOSIS — Z00.00 HEALTH MAINTENANCE EXAMINATION: Primary | ICD-10-CM

## 2018-04-11 DIAGNOSIS — I48.91 ATRIAL FIBRILLATION, UNSPECIFIED TYPE (HCC): Chronic | ICD-10-CM

## 2018-04-11 RX ORDER — FUROSEMIDE 20 MG/1
20 TABLET ORAL 3 TIMES DAILY
Qty: 280 TABLET | Refills: 3 | Status: SHIPPED | OUTPATIENT
Start: 2018-04-11 | End: 2018-05-10 | Stop reason: SDUPTHER

## 2018-04-11 RX ORDER — DIGOXIN 125 MCG
TABLET ORAL
Qty: 90 TABLET | Refills: 3 | Status: SHIPPED | OUTPATIENT
Start: 2018-04-11

## 2018-04-12 ENCOUNTER — OFFICE VISIT (OUTPATIENT)
Dept: PULMONOLOGY | Facility: CLINIC | Age: 83
End: 2018-04-12
Payer: MEDICARE

## 2018-04-12 VITALS
SYSTOLIC BLOOD PRESSURE: 124 MMHG | BODY MASS INDEX: 26.81 KG/M2 | HEART RATE: 80 BPM | OXYGEN SATURATION: 93 % | WEIGHT: 133 LBS | DIASTOLIC BLOOD PRESSURE: 84 MMHG | HEIGHT: 59 IN

## 2018-04-12 DIAGNOSIS — I27.21 PULMONARY ARTERY HYPERTENSION (HCC): ICD-10-CM

## 2018-04-12 DIAGNOSIS — J43.2 CENTRILOBULAR EMPHYSEMA (HCC): Primary | ICD-10-CM

## 2018-04-12 PROCEDURE — 94010 BREATHING CAPACITY TEST: CPT | Performed by: PHYSICIAN ASSISTANT

## 2018-04-12 PROCEDURE — 99214 OFFICE O/P EST MOD 30 MIN: CPT | Performed by: PHYSICIAN ASSISTANT

## 2018-04-12 NOTE — PROGRESS NOTES
Assessment/Plan:    No problem-specific Assessment & Plan notes found for this encounter  Diagnoses and all orders for this visit:    Centrilobular emphysema ([address] )  -     POCT spirometry        - Patient doing well with her breathing, stable mild HITCHCOCK  She will continue with the brovana twice per day, albuterol/ipratropium once per day  If not feeling well can use the duoneb up to 4 times per day which she has not had to do        - Spirometry done today shows moderate-severe restriction with FEV1 50%, previously showed mixed restriction and obstruction      Pulmonary artery hypertension ([address] )            - Prior echo done in 2013 showed estimated peak pressure of 55  She has had no increase in her shortness of breath or further limitation of her activity  She has not required any oxygen  Subjective:      Patient ID: Renetta Corona is a 80 y o  female  Patient is an 79 yo female former smoker who quit many years ago with PMH of COPD, CAD, AFib, pulmonary HTN  She is here today for follow up, she has not been seen since 2015  Overall has been doing well with her breathing, has not had any exacerbations or hospitalizations for her breathing  About 6 months ago she was having some shortness of breath and fatigue that lasted several weeks, thinks she may have had a preceding URI  She does move around some with a walker, her functional status has been stable  She has been using the brovana twice per day and ipratropiumalbtuerol usually once in between  No cough  The following portions of the patient's history were reviewed and updated as appropriate: allergies, current medications, past family history, past medical history, past social history, past surgical history and problem list     Review of Systems   Constitutional: Negative  HENT: Negative  Respiratory: Negative  Cardiovascular: Negative  Gastrointestinal: Negative  Genitourinary: Negative      Musculoskeletal: Positive for arthralgias and gait problem  Skin: Negative  Allergic/Immunologic: Negative  Psychiatric/Behavioral: Negative  Objective:      /84   Pulse 80   Ht 4' 10 5" (1 486 m)   Wt 60 3 kg (133 lb)   SpO2 93%   BMI 27 32 kg/m²          Physical Exam   Constitutional: She is oriented to person, place, and time  She appears well-developed and well-nourished  No distress  HENT:   Mouth/Throat: Oropharynx is clear and moist    Eyes: Pupils are equal, round, and reactive to light  Cardiovascular: Normal rate, regular rhythm and normal heart sounds  No murmur heard  Pulmonary/Chest: Effort normal  No accessory muscle usage  No respiratory distress  She has decreased breath sounds in the right upper field, the right middle field, the right lower field, the left upper field, the left middle field and the left lower field  She has no wheezes  She has no rhonchi  She has no rales  Abdominal: Soft  There is no tenderness  Musculoskeletal: Normal range of motion  She exhibits edema  In wheelchair   Neurological: She is alert and oriented to person, place, and time  Skin: Skin is warm and dry  No rash noted  Psychiatric: She has a normal mood and affect

## 2018-04-18 ENCOUNTER — APPOINTMENT (OUTPATIENT)
Dept: LAB | Facility: CLINIC | Age: 83
End: 2018-04-18
Payer: MEDICARE

## 2018-04-18 ENCOUNTER — TRANSCRIBE ORDERS (OUTPATIENT)
Dept: LAB | Facility: CLINIC | Age: 83
End: 2018-04-18

## 2018-04-18 DIAGNOSIS — I48.91 ATRIAL FIBRILLATION, UNSPECIFIED TYPE (HCC): Primary | ICD-10-CM

## 2018-04-18 LAB
INR PPP: 1.52 (ref 0.86–1.16)
PROTHROMBIN TIME: 18.4 SECONDS (ref 12.1–14.4)

## 2018-04-18 PROCEDURE — 36415 COLL VENOUS BLD VENIPUNCTURE: CPT

## 2018-04-18 PROCEDURE — 85610 PROTHROMBIN TIME: CPT

## 2018-04-19 ENCOUNTER — ANTICOAG VISIT (OUTPATIENT)
Dept: CARDIOLOGY CLINIC | Facility: CLINIC | Age: 83
End: 2018-04-19

## 2018-04-19 ENCOUNTER — TELEPHONE (OUTPATIENT)
Dept: INTERNAL MEDICINE CLINIC | Facility: CLINIC | Age: 83
End: 2018-04-19

## 2018-04-19 DIAGNOSIS — G47.00 INSOMNIA, UNSPECIFIED TYPE: ICD-10-CM

## 2018-04-19 DIAGNOSIS — I48.91 ATRIAL FIBRILLATION, UNSPECIFIED TYPE (HCC): ICD-10-CM

## 2018-04-19 RX ORDER — ZOLPIDEM TARTRATE 10 MG/1
10 TABLET ORAL
Qty: 90 TABLET | Refills: 1 | Status: SHIPPED | OUTPATIENT
Start: 2018-04-19 | End: 2018-08-31 | Stop reason: SDUPTHER

## 2018-04-19 NOTE — TELEPHONE ENCOUNTER
Pt reciently had a refill for zoleidem  States Dr Jackson Aquino used to give her zolpidem 10mg  Last script was written for 5mg  Pt says 5mg does not work for her, can we change script?  Please advise     MR-[phone]

## 2018-05-01 DIAGNOSIS — I48.91 ATRIAL FIBRILLATION, UNSPECIFIED TYPE (HCC): Primary | ICD-10-CM

## 2018-05-01 RX ORDER — WARFARIN SODIUM 2 MG/1
2-4 TABLET ORAL DAILY
Qty: 180 TABLET | Refills: 3 | Status: SHIPPED | OUTPATIENT
Start: 2018-05-01

## 2018-05-01 NOTE — TELEPHONE ENCOUNTER
Wilda Bobby from [facility] called on behalf of pt asking to renew pts warfarin 2mg    Pended med for approval

## 2018-05-08 ENCOUNTER — APPOINTMENT (OUTPATIENT)
Dept: LAB | Facility: CLINIC | Age: 83
End: 2018-05-08
Payer: MEDICARE

## 2018-05-09 ENCOUNTER — ANTICOAG VISIT (OUTPATIENT)
Dept: CARDIOLOGY CLINIC | Facility: CLINIC | Age: 83
End: 2018-05-09

## 2018-05-09 NOTE — PROGRESS NOTES
Spoke with patient giving her the new instructions of 1mg alternating with 2 mg and recheck in one week

## 2018-05-10 ENCOUNTER — OFFICE VISIT (OUTPATIENT)
Dept: CARDIOLOGY CLINIC | Facility: CLINIC | Age: 83
End: 2018-05-10
Payer: MEDICARE

## 2018-05-10 VITALS
SYSTOLIC BLOOD PRESSURE: 134 MMHG | HEIGHT: 59 IN | HEART RATE: 66 BPM | OXYGEN SATURATION: 89 % | WEIGHT: 138 LBS | DIASTOLIC BLOOD PRESSURE: 76 MMHG | BODY MASS INDEX: 27.82 KG/M2

## 2018-05-10 DIAGNOSIS — E78.2 MIXED HYPERLIPIDEMIA: ICD-10-CM

## 2018-05-10 DIAGNOSIS — R60.0 BILATERAL LEG EDEMA: ICD-10-CM

## 2018-05-10 DIAGNOSIS — I83.93 VARICOSE VEINS OF BOTH LOWER EXTREMITIES: ICD-10-CM

## 2018-05-10 DIAGNOSIS — Z79.01 ANTICOAGULANT LONG-TERM USE: ICD-10-CM

## 2018-05-10 DIAGNOSIS — I25.10 CORONARY ARTERY DISEASE INVOLVING NATIVE HEART WITHOUT ANGINA PECTORIS, UNSPECIFIED VESSEL OR LESION TYPE: Primary | ICD-10-CM

## 2018-05-10 DIAGNOSIS — I10 ESSENTIAL HYPERTENSION: ICD-10-CM

## 2018-05-10 DIAGNOSIS — I48.0 PAROXYSMAL ATRIAL FIBRILLATION (HCC): ICD-10-CM

## 2018-05-10 PROCEDURE — 99214 OFFICE O/P EST MOD 30 MIN: CPT | Performed by: INTERNAL MEDICINE

## 2018-05-10 RX ORDER — ATORVASTATIN CALCIUM 10 MG/1
10 TABLET, FILM COATED ORAL DAILY
Qty: 90 TABLET | Refills: 1 | Status: SHIPPED | OUTPATIENT
Start: 2018-05-10 | End: 2018-11-03 | Stop reason: SDUPTHER

## 2018-05-10 RX ORDER — FUROSEMIDE 20 MG/1
20 TABLET ORAL 2 TIMES DAILY
Qty: 180 TABLET | Refills: 0 | Status: SHIPPED | OUTPATIENT
Start: 2018-05-10

## 2018-05-10 RX ORDER — FUROSEMIDE 20 MG/1
20 TABLET ORAL 2 TIMES DAILY
Qty: 180 TABLET | Refills: 1 | Status: SHIPPED | OUTPATIENT
Start: 2018-05-10 | End: 2018-05-10 | Stop reason: SDUPTHER

## 2018-05-10 NOTE — PROGRESS NOTES
CARDIOLOGY OFFICE VISIT  [facility]  [address], [facility], [address]  Tel: [phone]      NAME: Christoper Blizzard  AGE: 80 y o  SEX: female  : 1934   MRN: [id number]      Chief Complaint:  Chief Complaint   Patient presents with    Atrial Fibrillation         History of Present Illness:   Patient comes for follow up  Has swelling in both ankles but denies chest pain / pressure, palpitations, lightheadedness, syncope, orthopnea, PND, claudication  Has history of COPD and pulmonary hypertension - Reports chronic stable shortness of breath with exertion    Patient with markedly sedentary lifestyle - uses a wheelchair - has COPD and arthritis    CAD -  History of CAD with calcifications noted on CAT scan  No clinical ischemia  No history of MI  Preserved LV function  No cardiac symptoms  Taking all medications regularly  PAF -  On digoxin and beta-blocker for rate control and Coumadin for anticoagulation  Denies bleeding from any site    HTN -  Has been hypertensive for many years  Taking medications regularly  Denies lightheadedness, headache, medication side effects  HLP -  Has had hyperlipidemia for many years  Taking statin regularly along with diet control  Denies myalgia  PCP closely monitoring the blood work      B/L LE edema - due to varicose veins and sedentary lifestyle    Past Medical History:  Past Medical History:   Diagnosis Date    A-fib ([facility] )     Anxiety     COPD (chronic obstructive pulmonary disease) ([facility] )     Former tobacco use     Generalized osteoarthritis     HTN (hypertension)     Hyperlipidemia     Primary pulmonary hypertension ([facility] )     Restrictive lung disease          Past Surgical History:  Past Surgical History:   Procedure Laterality Date    HIP SURGERY           Family History:  Family History   Problem Relation Age of Onset    Heart disease Family          Social History:  Social History     Social History    Marital status: /Civil Union     Spouse name: N/A    Number of children: 3    Years of education: N/A     Occupational History    retired      Social History Main Topics    Smoking status: Former Smoker     Quit date: 2/19/1993    Smokeless tobacco: Former User    Alcohol use 0 6 oz/week     1 Glasses of wine per week      Comment: social    Drug use: No    Sexual activity: No     Other Topics Concern    Not on file     Social History Narrative    No narrative on file         Active Problems:  Patient Active Problem List   Diagnosis    Alopecia    Atrial fibrillation ([place] [address] )    Coronary artery disease involving 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    Mixed hyperlipidemia    Essential hypertension    Pulmonary artery hypertension ([place] [address] )    Pulmonary emphysema ([place] [address] )    Venous insufficiency    Anticoagulant long-term use    Varicose veins of both lower extremities         The following portions of the patient's history were reviewed and updated as appropriate: past medical history, past surgical history, past family history,  past social history, current medications, allergies and problem list       Review of Systems:  Constitutional: Denies fever, chills, fatigue  Eyes: Denies eye redness, eye discharge, double vision  ENT: Denies hearing loss, tinnitus, sneezing, nasal discharge, sore throat   Respiratory: Denies cough, expectoration, hemoptysis   +shortness of breath  Cardiovascular: Denies chest pain, palpitations, orthopnea, PND  +lower extremity swelling  Gastrointestinal: Denies abdominal pain, nausea, vomiting, hematemesis, diarrhea, bloody stools  Genito-Urinary: Denies dysuria, incontinence  Neurologic: Denies confusion, lightheadedness, syncope, headache, seizures  Endocrine: Denies polyuria, polydipsia, temperature intolerance  Allergy and Immunology: Denies hives, insect bite sensitivity  Hematological and Lymphatic: Denies bleeding problems, swollen glands   Psychological: Denies depression, suicidal ideation, anxiety, panic  Dermatological: Denies pruritus, rash, skin lesion changes      Vitals:  Vitals:    05/10/18 1014   BP: 134/76   Pulse: 66   SpO2: (!) 89%       Body mass index is 28 35 kg/m²  Weight (last 2 days)     Date/Time   Weight    05/10/18 1014  62 6 (138)                Physical Examination:  General: Examined on a wheelchair  Patient is not in acute distress  Awake, alert, oriented in time, place and person  Responding to commands  Head: Normocephalic  Atraumatic  Eyes: Nonicteric  ENT: Normal external ear canals  Nares normal, no drainage  Lips and oral mucosa normal  Neck: Supple  JVP not raised  Trachea central  No thyromegaly  Lungs: Bilateral bronchovascular breath sounds with no crackles or rhonchi  Chest wall: No tenderness  Cardiovascular: RRR  S1 and S2 normal  No murmur, rub or gallop  Gastrointestinal: Abdomen soft, nontender  No guarding or rigidity  Bowel sounds present  Neurologic: Patient is awake, alert, oriented in time, place and person  Responding to command  Integumentary:  No skin rash  Lymphatic: No cervical lymphadenopathy  Back: Symmetric  No CVA tenderness  Extremities: No clubbing, cyanosis   B/L LE edema+      Laboratory Results:  CBC with diff:   Lab Results   Component Value Date    WBC 4 66 09/06/2017    WBC 4 4 03/03/2015    RBC 4 35 09/06/2017    RBC 4 41 03/03/2015    HGB 12 9 09/06/2017    HGB 13 4 03/03/2015    HCT 40 7 09/06/2017    HCT 40 8 03/03/2015    MCV 94 09/06/2017    MCV 92 03/03/2015    MCH 29 7 09/06/2017    MCH 30 4 03/03/2015    RDW 13 4 09/06/2017    RDW 12 0 03/03/2015     (L) 09/06/2017     (L) 03/03/2015       CMP:  Lab Results   Component Value Date    CREATININE 0 74 09/06/2017    CREATININE 0 6 03/03/2015    BUN 27 (H) 09/06/2017    BUN 23 (H) 03/03/2015     09/06/2017     (H) 03/03/2015    K 4 3 09/06/2017    K 4 2 03/03/2015     09/06/2017     03/03/2015    CO2 33 (H) 09/06/2017    CO2 34 1 (H) 03/03/2015    GLUCOSE 81 04/14/2016    GLUCOSE 94 03/03/2015    PROT 7 1 09/06/2017    PROT 7 4 03/03/2015    ALKPHOS 66 09/06/2017    ALKPHOS 126 03/03/2015    ALT 12 09/06/2017    ALT 20 03/03/2015    AST 17 09/06/2017    AST 19 03/03/2015       No results found for: HGBA1C, MG, PHOS    No results found for: TROPONINI, CKMB, CKTOTAL    Lipid Profile:   Lab Results   Component Value Date    CHOL 167 09/06/2017    CHOL 183 04/14/2016    CHOL 196 03/03/2015     Lab Results   Component Value Date    HDL 60 09/06/2017    HDL 65 (H) 04/14/2016    HDL 78 03/03/2015     Lab Results   Component Value Date    LDLCALC 87 09/06/2017    LDLCALC 98 04/14/2016    LDLCALC 96 03/03/2015     Lab Results   Component Value Date    TRIG 98 09/06/2017    TRIG 100 04/14/2016    TRIG 112 03/03/2015     Medications:    Current Outpatient Prescriptions:     albuterol (2 5 mg/3 mL) 0 083 % nebulizer solution, Inhale, Disp: , Rfl:     arformoterol (BROVANA) 15 mcg/2 mL, Inhale 1 vial 2 (two) times a day, Disp: , Rfl:     digoxin (LANOXIN) 0 125 mg tablet, TAKE 1 TABLET DAILY, Disp: 90 tablet, Rfl: 3    furosemide (LASIX) 20 mg tablet, Take 1 tablet (20 mg total) by mouth 3 (three) times a day (Patient taking differently: Take 20 mg by mouth daily  ), Disp: 280 tablet, Rfl: 3    ipratropium-albuterol (DUO-NEB) 0 5-2 5 mg/3 mL, Inhale, Disp: , Rfl:     metoprolol succinate (TOPROL-XL) 50 mg 24 hr tablet, Take 1 tablet (50 mg total) by mouth daily, Disp: 90 tablet, Rfl: 2    potassium chloride (K-DUR,KLOR-CON) 20 mEq tablet, Take 1 tablet (20 mEq total) by mouth daily, Disp: 90 tablet, Rfl: 3    quinapril (ACCUPRIL) 20 mg tablet, Take 1 5 tablets by mouth daily, Disp: , Rfl:     traMADol (ULTRAM) 50 mg tablet, Take 1 tablet (50 mg total) by mouth 2 (two) times a day as needed for moderate pain, Disp: 30 tablet, Rfl: 1    warfarin (COUMADIN) 2 mg tablet, Take 1-2 tablets (2-4 mg total) by mouth daily, Disp: 180 tablet, Rfl: 3    zolpidem (AMBIEN) 10 mg tablet, Take 1 tablet (10 mg total) by mouth daily at bedtime as needed for sleep, Disp: 90 tablet, Rfl: 1      Allergies:  No Known Allergies      Assessment and Plan:  1  Coronary artery disease involving 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, unspecified vessel or lesion type   stable  Continue beta-blocker  Not on aspirin due to high bleeding risk along with Coumadin  Low-dose statin started as LDL not at goal    2  Paroxysmal atrial fibrillation / Anticoagulant long-term use   on beta-blocker for rate control and Coumadin for anticoagulation  INR goal 2-3  Denies bleeding from any site    4  Essential hypertension   BP stable  Continue current medications    5  Mixed hyperlipidemia   statin added  Diet control discussed  LDL not at goal in view of CAD    6  Bilateral lower extremity edema due to Varicose veins of both lower extremities   keep legs elevated while in bed  Abhijit stockings during the daytime    Recommend aggressive risk factor modification and therapeutic lifestyle changes  Low-salt, low-calorie, low-fat, low-cholesterol diet   Discussed concepts of atherosclerosis, including signs and symptoms of cardiac disease  Previous studies were reviewed  Safety measures were reviewed  Questions were entertained and answered  Patient was advised to report any problems requiring medical attention  Follow-up with PCP and appropriate specialist and lab work as discussed  Return for follow up visit as scheduled or earlier, if needed  thank you for allowing me to participate in the care and evaluation of your patient  Should you have any questions, please feel free to contact me        Renee Castillo MD  5/10/2018,10:44 AM

## 2018-05-14 DIAGNOSIS — J44.9 CHRONIC OBSTRUCTIVE PULMONARY DISEASE, UNSPECIFIED COPD TYPE (HCC): Primary | ICD-10-CM

## 2018-05-14 RX ORDER — IPRATROPIUM BROMIDE AND ALBUTEROL SULFATE 2.5; .5 MG/3ML; MG/3ML
SOLUTION RESPIRATORY (INHALATION)
Qty: 90 ML | Refills: 5 | Status: SHIPPED | OUTPATIENT
Start: 2018-05-14 | End: 2018-07-06 | Stop reason: SDUPTHER

## 2018-05-17 ENCOUNTER — APPOINTMENT (OUTPATIENT)
Dept: LAB | Facility: CLINIC | Age: 83
End: 2018-05-17
Payer: MEDICARE

## 2018-05-18 ENCOUNTER — ANTICOAG VISIT (OUTPATIENT)
Dept: CARDIOLOGY CLINIC | Facility: CLINIC | Age: 83
End: 2018-05-18

## 2018-05-18 ENCOUNTER — TELEPHONE (OUTPATIENT)
Dept: CARDIOLOGY CLINIC | Facility: CLINIC | Age: 83
End: 2018-05-18

## 2018-05-18 DIAGNOSIS — I48.91 ATRIAL FIBRILLATION, UNSPECIFIED TYPE (HCC): Primary | ICD-10-CM

## 2018-05-18 DIAGNOSIS — I48.91 ATRIAL FIBRILLATION, UNSPECIFIED TYPE (HCC): ICD-10-CM

## 2018-05-18 NOTE — TELEPHONE ENCOUNTER
Per se6 hold 1 day  DOSE CHANGE  1 5mg daily    s/w patient and spouse, verbally understands  Patient needs 1 mg tabs sent to pharmacy

## 2018-05-19 ENCOUNTER — TELEPHONE (OUTPATIENT)
Dept: INTERNAL MEDICINE CLINIC | Facility: CLINIC | Age: 83
End: 2018-05-19

## 2018-05-21 ENCOUNTER — TELEPHONE (OUTPATIENT)
Dept: CARDIOLOGY CLINIC | Facility: CLINIC | Age: 83
End: 2018-05-21

## 2018-05-21 DIAGNOSIS — I48.0 PAROXYSMAL ATRIAL FIBRILLATION (HCC): Primary | ICD-10-CM

## 2018-05-21 NOTE — TELEPHONE ENCOUNTER
S/w pt, she was confused on her INR instructions  Pts new dosage is 1 5mg  New script for 1mg pending to the pharmacy  Explained to pt that she will take 1 full tablet and a half of a tablet to make 1 5mg  Verbally understood

## 2018-05-22 RX ORDER — WARFARIN SODIUM 1 MG/1
TABLET ORAL
Qty: 90 TABLET | Refills: 1 | Status: SHIPPED | OUTPATIENT
Start: 2018-05-22 | End: 2018-07-09 | Stop reason: CLARIF

## 2018-05-30 ENCOUNTER — APPOINTMENT (OUTPATIENT)
Dept: LAB | Facility: CLINIC | Age: 83
End: 2018-05-30
Payer: MEDICARE

## 2018-05-30 ENCOUNTER — ANTICOAG VISIT (OUTPATIENT)
Dept: CARDIOLOGY CLINIC | Facility: CLINIC | Age: 83
End: 2018-05-30

## 2018-05-30 ENCOUNTER — TELEPHONE (OUTPATIENT)
Dept: CARDIOLOGY CLINIC | Facility: CLINIC | Age: 83
End: 2018-05-30

## 2018-05-30 DIAGNOSIS — I48.91 ATRIAL FIBRILLATION, UNSPECIFIED TYPE (HCC): ICD-10-CM

## 2018-06-08 ENCOUNTER — TELEPHONE (OUTPATIENT)
Dept: CARDIOLOGY CLINIC | Facility: CLINIC | Age: 83
End: 2018-06-08

## 2018-06-08 NOTE — TELEPHONE ENCOUNTER
S/w pt, states that she is wheezing a lot and has difficulty breathing when she's falling asleep  Informed pt that these symptoms are not necessarily cardiac and she may need to get a letter from her pulmonologist  Pt states that she sees Dr Breanna Cai but still wanted me to ask you

## 2018-06-08 NOTE — TELEPHONE ENCOUNTER
Pt needs letter stating that she is not well enough to go on a cruise  They had a cruise paid for but cancelled it because pt has not been feeling well and in order for them to get their money back they need a letter from Dr Jinny Jha  Please call when ready

## 2018-06-11 ENCOUNTER — DOCUMENTATION (OUTPATIENT)
Dept: PULMONOLOGY | Facility: CLINIC | Age: 83
End: 2018-06-11

## 2018-06-11 ENCOUNTER — TELEPHONE (OUTPATIENT)
Dept: PULMONOLOGY | Facility: CLINIC | Age: 83
End: 2018-06-11

## 2018-06-12 ENCOUNTER — TELEPHONE (OUTPATIENT)
Dept: CARDIOLOGY CLINIC | Facility: CLINIC | Age: 83
End: 2018-06-12

## 2018-06-19 ENCOUNTER — APPOINTMENT (OUTPATIENT)
Dept: LAB | Facility: CLINIC | Age: 83
End: 2018-06-19
Payer: MEDICARE

## 2018-06-19 ENCOUNTER — ANTICOAG VISIT (OUTPATIENT)
Dept: CARDIOLOGY CLINIC | Facility: CLINIC | Age: 83
End: 2018-06-19

## 2018-06-19 DIAGNOSIS — I25.10 CORONARY ARTERIOSCLEROSIS: Chronic | ICD-10-CM

## 2018-06-19 DIAGNOSIS — I48.91 ATRIAL FIBRILLATION, UNSPECIFIED TYPE (HCC): ICD-10-CM

## 2018-06-19 DIAGNOSIS — I10 ESSENTIAL HYPERTENSION: Chronic | ICD-10-CM

## 2018-06-19 LAB
ALBUMIN SERPL BCP-MCNC: 3.2 G/DL (ref 3.5–5)
ALP SERPL-CCNC: 98 U/L (ref 46–116)
ALT SERPL W P-5'-P-CCNC: 15 U/L (ref 12–78)
ANION GAP SERPL CALCULATED.3IONS-SCNC: 5 MMOL/L (ref 4–13)
AST SERPL W P-5'-P-CCNC: 15 U/L (ref 5–45)
BILIRUB SERPL-MCNC: 0.73 MG/DL (ref 0.2–1)
BUN SERPL-MCNC: 26 MG/DL (ref 5–25)
CALCIUM SERPL-MCNC: 8.6 MG/DL (ref 8.3–10.1)
CHLORIDE SERPL-SCNC: 106 MMOL/L (ref 100–108)
CHOLEST SERPL-MCNC: 126 MG/DL (ref 50–200)
CO2 SERPL-SCNC: 30 MMOL/L (ref 21–32)
CREAT SERPL-MCNC: 0.69 MG/DL (ref 0.6–1.3)
ERYTHROCYTE [DISTWIDTH] IN BLOOD BY AUTOMATED COUNT: 13.7 % (ref 11.6–15.1)
GFR SERPL CREATININE-BSD FRML MDRD: 80 ML/MIN/1.73SQ M
GLUCOSE P FAST SERPL-MCNC: 81 MG/DL (ref 65–99)
HCT VFR BLD AUTO: 38 % (ref 34.8–46.1)
HDLC SERPL-MCNC: 70 MG/DL (ref 40–60)
HGB BLD-MCNC: 11.5 G/DL (ref 11.5–15.4)
LDLC SERPL CALC-MCNC: 45 MG/DL (ref 0–100)
MCH RBC QN AUTO: 29.4 PG (ref 26.8–34.3)
MCHC RBC AUTO-ENTMCNC: 30.3 G/DL (ref 31.4–37.4)
MCV RBC AUTO: 97 FL (ref 82–98)
NONHDLC SERPL-MCNC: 56 MG/DL
PLATELET # BLD AUTO: 120 THOUSANDS/UL (ref 149–390)
PMV BLD AUTO: 10.6 FL (ref 8.9–12.7)
POTASSIUM SERPL-SCNC: 4.7 MMOL/L (ref 3.5–5.3)
PROT SERPL-MCNC: 6.8 G/DL (ref 6.4–8.2)
RBC # BLD AUTO: 3.91 MILLION/UL (ref 3.81–5.12)
SODIUM SERPL-SCNC: 141 MMOL/L (ref 136–145)
TRIGL SERPL-MCNC: 54 MG/DL
TSH SERPL DL<=0.05 MIU/L-ACNC: 1.1 UIU/ML (ref 0.36–3.74)
WBC # BLD AUTO: 4.15 THOUSAND/UL (ref 4.31–10.16)

## 2018-06-19 PROCEDURE — 80053 COMPREHEN METABOLIC PANEL: CPT

## 2018-06-19 PROCEDURE — 80061 LIPID PANEL: CPT

## 2018-06-19 PROCEDURE — 84443 ASSAY THYROID STIM HORMONE: CPT

## 2018-06-19 PROCEDURE — 85027 COMPLETE CBC AUTOMATED: CPT

## 2018-06-26 RX ORDER — WARFARIN SODIUM 1 MG/1
TABLET ORAL
Qty: 45 TABLET | Refills: 11 | Status: SHIPPED | OUTPATIENT
Start: 2018-06-26

## 2018-07-02 DIAGNOSIS — J44.9 CHRONIC OBSTRUCTIVE PULMONARY DISEASE, UNSPECIFIED COPD TYPE (HCC): Primary | ICD-10-CM

## 2018-07-02 RX ORDER — ARFORMOTEROL TARTRATE 15 UG/2ML
15 SOLUTION RESPIRATORY (INHALATION) 2 TIMES DAILY
Qty: 540 ML | Refills: 3 | Status: SHIPPED | OUTPATIENT
Start: 2018-07-02

## 2018-07-06 DIAGNOSIS — J44.9 CHRONIC OBSTRUCTIVE PULMONARY DISEASE, UNSPECIFIED COPD TYPE (HCC): ICD-10-CM

## 2018-07-06 RX ORDER — IPRATROPIUM BROMIDE AND ALBUTEROL SULFATE 2.5; .5 MG/3ML; MG/3ML
SOLUTION RESPIRATORY (INHALATION)
Qty: 300 ML | Refills: 4 | Status: SHIPPED | OUTPATIENT
Start: 2018-07-06

## 2018-07-09 ENCOUNTER — TELEPHONE (OUTPATIENT)
Dept: INTERNAL MEDICINE CLINIC | Facility: CLINIC | Age: 83
End: 2018-07-09

## 2018-07-09 ENCOUNTER — APPOINTMENT (OUTPATIENT)
Dept: LAB | Facility: CLINIC | Age: 83
End: 2018-07-09
Payer: MEDICARE

## 2018-07-09 ENCOUNTER — OFFICE VISIT (OUTPATIENT)
Dept: INTERNAL MEDICINE CLINIC | Facility: CLINIC | Age: 83
End: 2018-07-09
Payer: MEDICARE

## 2018-07-09 VITALS
BODY MASS INDEX: 27.54 KG/M2 | HEIGHT: 59 IN | OXYGEN SATURATION: 89 % | DIASTOLIC BLOOD PRESSURE: 70 MMHG | SYSTOLIC BLOOD PRESSURE: 102 MMHG | WEIGHT: 136.6 LBS | HEART RATE: 59 BPM

## 2018-07-09 DIAGNOSIS — Z13.31 DEPRESSION SCREENING NEGATIVE: ICD-10-CM

## 2018-07-09 DIAGNOSIS — I83.93 VARICOSE VEINS OF BOTH LOWER EXTREMITIES: ICD-10-CM

## 2018-07-09 DIAGNOSIS — R26.9 GAIT DISTURBANCE: ICD-10-CM

## 2018-07-09 DIAGNOSIS — Z91.81 AT RISK FOR FALLS: ICD-10-CM

## 2018-07-09 DIAGNOSIS — Z00.00 MEDICARE ANNUAL WELLNESS VISIT, SUBSEQUENT: Primary | ICD-10-CM

## 2018-07-09 DIAGNOSIS — J43.9 PULMONARY EMPHYSEMA, UNSPECIFIED EMPHYSEMA TYPE (HCC): Chronic | ICD-10-CM

## 2018-07-09 DIAGNOSIS — I10 ESSENTIAL HYPERTENSION: ICD-10-CM

## 2018-07-09 PROCEDURE — 99214 OFFICE O/P EST MOD 30 MIN: CPT | Performed by: INTERNAL MEDICINE

## 2018-07-09 PROCEDURE — G0439 PPPS, SUBSEQ VISIT: HCPCS | Performed by: INTERNAL MEDICINE

## 2018-07-09 RX ORDER — POTASSIUM CHLORIDE 20 MEQ/1
20 TABLET, EXTENDED RELEASE ORAL DAILY
Qty: 90 TABLET | Refills: 3 | Status: SHIPPED | OUTPATIENT
Start: 2018-07-09

## 2018-07-09 NOTE — PROGRESS NOTES
INTERNAL MEDICINE FOLLOW-UP OFFICE VISIT  [facility] [address] [place] [address]    NAME: Kortney De Jesus  AGE: 80 y o  SEX: female  : 1934     DATE: 2018     Assessment and Plan:     Asssessment  1  Pulmonary emphysema, unspecified emphysema type ([address] )  2  Essential hypertension  3  Varicose veins of both lower extremities  4  Gait disturbance  5  At risk for falls    Plan    Patient's cardiopulmonary status is stable  Continue current medications and follow-up with pulmonary and cardiology  Patient is at increased risk for falls which is multifactorial in nature  Believe at this time, it is medically necessary for patient to have a wheelchair to get around her home  She has multiple health conditions which limit her mobility and place her at higher risk for falls  She is also on coumadin which can increase her bleeding risk if she were to fall  She denies any concerns regarding pressure ulcers/skin tears/sores of her buttock/hip/leg region  We will fax a wheelchair order to [address] This Encounter   Procedures    Wheelchair    CBC    Comprehensive metabolic panel    TSH, 3rd generation     Return in about 4 months (around 2018) for Follow-up  Counseling:     · Medication Side Effects - Adverse side effects of medications were reviewed with the patient/guardian today: Yes  · Counseling was given regarding: Diagnostic results, Prognosis, Risks and benefits of tx options, Intructions for management, Patient and family education, Importance of tx compliance, Risk factor reductions and Impressions  · Barriers to treatment include: Older than 70 years, multiple medical co-morbidities      Chief Complaint:     Chief Complaint   Patient presents with    Medicare Wellness Visit     wellness, 4 month f/u, and labs- 2018      History of Present Illness:     Patient presents for routine follow-up   She states her breathing is about the same  Doesn't ambulate much  Has a lot of problems walking due to generalized arthritis and chronic venous insufficiency  Has venous stasis changes bilaterally  Denies any current ulcerations  Denies any hip or buttock sores/ulcers/skin tears  She uses a rolling walker to ambulate but getting more difficult for her and she still feels unsteady and has history of falls  She is on coumadin due to Afib  She is requesting wheelchair script  The following portions of the patient's history were reviewed and updated as appropriate: allergies, current medications, past family history, past medical history, past social history, past surgical history and problem list      Review of Systems:     Review of Systems   Constitutional: Positive for fatigue  Negative for activity change and appetite change  Respiratory: Positive for shortness of breath  Negative for apnea, cough, chest tightness and wheezing  Cardiovascular: Positive for leg swelling  Negative for chest pain and palpitations  Gastrointestinal: Negative for abdominal distention, abdominal pain, blood in stool, constipation, diarrhea, nausea and vomiting  Musculoskeletal: Positive for arthralgias, back pain and gait problem  Negative for joint swelling and myalgias  Skin: Negative for rash and wound  Neurological: Positive for weakness  Negative for dizziness, tremors, seizures, syncope, facial asymmetry, speech difficulty, light-headedness, numbness and headaches  Psychiatric/Behavioral: Negative for behavioral problems, confusion, hallucinations, sleep disturbance and suicidal ideas  The patient is not nervous/anxious         Problem List:     Patient Active Problem List   Diagnosis    Alopecia    [facility]    Coronary artery disease involving 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    Mixed hyperlipidemia    Essential hypertension    Pulmonary artery hypertension [facility]    Pulmonary emphysema ([facility]    Venous insufficiency    Anticoagulant long-term use    Varicose veins of both lower extremities    Bilateral leg edema      Objective:     /70 (BP Location: Left arm, Patient Position: Sitting, Cuff Size: Standard)   Pulse 59   Ht 4' 10 5" (1 486 m) Comment: w/ shoe denied off (in wheelchair)  Wt 62 kg (136 lb 9 6 oz) Comment: w/ shoe denied off (in wheelchair)  SpO2 (!) 89%   BMI 28 06 kg/m²     Physical Exam   Constitutional: She is oriented to person, place, and time  She appears well-developed and well-nourished  No distress  Eyes: Conjunctivae are normal  Right eye exhibits no discharge  Left eye exhibits no discharge  No scleral icterus  Neck: Neck supple  No JVD present  No thyromegaly present  Cardiovascular: Normal rate, regular rhythm, normal heart sounds and intact distal pulses  Exam reveals no gallop and no friction rub  No murmur heard  Pulmonary/Chest: Effort normal  No respiratory distress  She has decreased breath sounds  She has no wheezes  She has no rales  She exhibits no tenderness  Abdominal: Soft  Bowel sounds are normal  She exhibits no distension and no mass  There is no tenderness  There is no rebound and no guarding  Musculoskeletal: She exhibits edema (Chronic venous stasis dermatitis bilateral lower extremities with bilateral lower extremity edema)  Lymphadenopathy:     She has no cervical adenopathy  Neurological: She is alert and oriented to person, place, and time  Gait not tested as patient is in wheelchair   Skin: Skin is warm and dry  She is not diaphoretic  Psychiatric: She has a normal mood and affect  Her behavior is normal    Vitals reviewed  Pertinent Laboratory/Diagnostic Studies:    Laboratory Results: I have personally reviewed the pertinent laboratory results/reports     PHQ-9  Negative for depression with PHQ2 score of 0  Fall Risk  The patient has a history of falls  A risk assessment for falls was completed  A plan of care for falls was documented  Current Medications:     Outpatient Medications Prior to Visit   Medication Sig Dispense Refill    albuterol (2 5 mg/3 mL) 0 083 % nebulizer solution Inhale      arformoterol (BROVANA) 15 mcg/2 mL nebulizer solution Take 1 vial (15 mcg total) by nebulization 2 (two) times a day 540 mL 3    atorvastatin (LIPITOR) 10 mg tablet Take 1 tablet (10 mg total) by mouth daily 90 tablet 1    digoxin (LANOXIN) 0 125 mg tablet TAKE 1 TABLET DAILY 90 tablet 3    furosemide (LASIX) 20 mg tablet Take 1 tablet (20 mg total) by mouth 2 (two) times a day 180 tablet 0    ipratropium-albuterol (DUO-NEB) 0 5-2 5 mg/3 mL nebulizer solution ONE VIAL VIA NEBULIZER ONCE DAILY 300 mL 4    metoprolol succinate (TOPROL-XL) 50 mg 24 hr tablet Take 1 tablet (50 mg total) by mouth daily 90 tablet 2    quinapril (ACCUPRIL) 20 mg tablet Take 1 5 tablets by mouth daily      warfarin (COUMADIN) 1 mg tablet Take 1 tab(1mg) or 1 5 tabs (1 5mg) daily as directed by provider  45 tablet 11    warfarin (COUMADIN) 2 mg tablet Take 1-2 tablets (2-4 mg total) by mouth daily 180 tablet 3    zolpidem (AMBIEN) 10 mg tablet Take 1 tablet (10 mg total) by mouth daily at bedtime as needed for sleep 90 tablet 1    potassium chloride (K-DUR,KLOR-CON) 20 mEq tablet Take 1 tablet (20 mEq total) by mouth daily 90 tablet 3    warfarin (COUMADIN) 1 mg tablet Take 1 5 tablets as prescribed by provider 90 tablet 1     No facility-administered medications prior to visit          Wen Houston DO  [facility]

## 2018-07-09 NOTE — PROGRESS NOTES
Medicare Annual Wellness Visit  [facility] - [facility]  NAME: Betty Trent SEX: female : 1934    HPI:  Fabienne Irvin is a 80 y o  female who presents to clinic today for subsequent AWV      PATIENT CARE TEAM:  Patient Care Team:  Winston Thomason DO as PCP - General (Internal Medicine)  Andana Edgar MD (Pulmonology)    PAST MEDICAL HISTORY:  Past Medical History:   Diagnosis Date    A-fib [facility])     Anxiety     LAST ASSESSED: 14    Atrial fibrillation ([facility] )     LAST ASSESSED: 14    Chronic obstructive lung disease ([facility] )     COPD (chronic obstructive pulmonary disease) (HCC)     Emphysema lung ([facility] )     EMPHYSEMA    Former tobacco use     Fracture of hip ([facility] )     LAST ASSESSED: 14    Generalized osteoarthritis     HTN (hypertension)     LAST ASSESSED: 17    Hyperlipidemia     Osteoarthritis     Personal history of kyphosis     LAST ASSESSED: 14    Personal history of pulmonary hypertension     LAST ASSESSED: 14    Primary pulmonary hypertension (HCC)     Restrictive lung disease     Shortness of breath     Venous insufficiency (chronic) (peripheral)      PAST SURGICAL HISTORY:  Past Surgical History:   Procedure Laterality Date    HIP SURGERY      LAST ASSESSED: 14    TOTAL HIP ARTHROPLASTY       PROBLEM LIST:  Patient Active Problem List   Diagnosis    Alopecia    Atrial fibrillation ([address] )    Coronary artery disease involving 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    Mixed hyperlipidemia    Essential hypertension    Pulmonary artery hypertension (HCC)    Pulmonary emphysema (HCC)    Venous insufficiency    Anticoagulant long-term use    Varicose veins of both lower extremities    Bilateral leg edema     FAMILY HISTORY:  Family History   Problem Relation Age of Onset    Heart disease Family         CARDIOVASCULAR DISORDER    Heart failure Father      SOCIAL HISTORY:  Fabienne Irvin reports that she quit smoking about 25 years ago  Her smoking use included Cigarettes  She has never used smokeless tobacco  She reports that she drinks about 0 6 oz of alcohol per week   She reports that she does not use drugs  ALLERGIES:  No Known Allergies    CURRENT MEDICATIONS:  Current Outpatient Prescriptions   Medication Sig Dispense Refill    albuterol (2 5 mg/3 mL) 0 083 % nebulizer solution Inhale      arformoterol (BROVANA) 15 mcg/2 mL nebulizer solution Take 1 vial (15 mcg total) by nebulization 2 (two) times a day 540 mL 3    atorvastatin (LIPITOR) 10 mg tablet Take 1 tablet (10 mg total) by mouth daily 90 tablet 1    digoxin (LANOXIN) 0 125 mg tablet TAKE 1 TABLET DAILY 90 tablet 3    furosemide (LASIX) 20 mg tablet Take 1 tablet (20 mg total) by mouth 2 (two) times a day 180 tablet 0    ipratropium-albuterol (DUO-NEB) 0 5-2 5 mg/3 mL nebulizer solution ONE VIAL VIA NEBULIZER ONCE DAILY 300 mL 4    metoprolol succinate (TOPROL-XL) 50 mg 24 hr tablet Take 1 tablet (50 mg total) by mouth daily 90 tablet 2    potassium chloride (K-DUR,KLOR-CON) 20 mEq tablet Take 1 tablet (20 mEq total) by mouth daily 90 tablet 3    quinapril (ACCUPRIL) 20 mg tablet Take 1 5 tablets by mouth daily      warfarin (COUMADIN) 1 mg tablet Take 1 tab(1mg) or 1 5 tabs (1 5mg) daily as directed by provider  45 tablet 11    warfarin (COUMADIN) 2 mg tablet Take 1-2 tablets (2-4 mg total) by mouth daily 180 tablet 3    zolpidem (AMBIEN) 10 mg tablet Take 1 tablet (10 mg total) by mouth daily at bedtime as needed for sleep 90 tablet 1     No current facility-administered medications for this visit        IMMUNIZATIONS:  Immunization History   Administered Date(s) Administered    Influenza 10/13/2009, 10/15/2010     HEALTH MAINTENANCE:  Health Maintenance   Topic Date Due    DTaP,Tdap,and Td Vaccines (1 - Tdap) [birth year]/1955    GLAUCOMA SCREENING 65 + YR  06/15/2001    PNEUMOCOCCAL POLYSACCHARIDE VACCINE AGE 72 AND OVER  07/09/2038 (Originally 6/15/1999)    INFLUENZA VACCINE  09/01/2018    Fall Risk  02/19/2019    Urinary Incontinence Screening  02/19/2019    Depression Screening PHQ-9  07/09/2019    Medicare Annual Wellness Visit (AWV)  07/09/2019     PATIENT HEALTH RISK ASSESSMENT (HRA):  AWV Clinical     ISAR:   Previous hospitalizations?:  No       Once in a Lifetime Medicare Screening:   EKG performed?:  No    AAA screening performed? (if performed, please add date to Health Maintenance):  No       Medicare Screening Tests and Risk Assessment:   AAA Risk Assessment     Tobacco use (males only):  Yes    Family history of AAA:  No   Osteoporosis Risk Assessment    None indicated:  Yes    HIV Risk Assessment    None indicated:  Yes        Drug and Alcohol Use:   Tobacco use    Cigarettes:  former smoker    Quit date:  7/9/08   Smokeless:  never used smokeless tobacco    Tobacco use duration    Tobacco Cessation Readiness    Alcohol use    Alcohol use:  never    Alcohol Treatment Readiness   Illicit Drug Use    Drug use:  never    Drug type:  no sedative use       Diet & Exercise:   Diet   What is your diet?:  Low Saturated Fat   How many servings a day of the following:   Fruits and Vegetables:  3-4 Meat:  1-2   Whole Grains:  1 Simple Carbs:  1   Dairy:  0 Soda:  0   Coffee:  1 Tea:  1   Exercise    Do you currently exercise?:  currently not exercising       Cognitive Impairment Screening:   Cognitive Impairment Screening    Do you have difficulty learning or retaining new information?:  Yes Do you have difficulty handling new tasks?:  No   Do you have difficulty with reasoning?:  No Do you have difficulty with spatial ability and orientation?:  No   Do you have difficulty with language?:  No Do you have difficulty with behavior?:  No       Functional Ability/Level of Safety:   Hearing    Hearing difficulties:  No Bilateral:  normal   Left:  normal Right:  normal   Hearing aid:  No    Hearing Impairment Assessment Hearing status:  No impairment   Do your family members ever complain that you turn on the radio or T V  too loudly?:  No Do you find that other people have to repeat themselves when talking to you?:  No   Do you have difficulty hearing while talking on the phone?:  No Has anyone ever told you that you are speaking too loudly when talking with them?:  No   Do you have trouble hearing the doorbell or phone ringing?:  No Do you have difficulty hearing such that you feel frustrated talking to people?:  No   Do you feel sad because you cannot hear well?:  No Do you feel inconvienced due to your hearing problem?:  No   Do you think you would be a happier person if you could hear better?:  No Would you be willing to go for a hearing aid fitting if suggested?:  No   Current Activities    Status:  no ADL's   Help needed with the folllowing:    Using the phone:  No Transportation:  Yes   Shopping:  Yes Preparing Meals:  Yes   Doing Housework:  Yes Doing Laundry:  Yes   Managing Medications:  No Managing Money:  No   ADL    Feeding:  Independant   Oral hygiene and Facial grooming:  Independant, Minimum assistance   Bathing:  Minimum assistance   Upper Body Dressing:  Independant, Minimum assistance   Lower Body Dressing:  Independant, Minimum assistance   Toileting:  Independant   Bed Mobility:  Independant   Fall Risk   Have you fallen in the last 12 months?:  No Are you unsteady on your feet?:  Yes    Are you taking any medications that may cause fatigue or dizziness?:  No   Do you have any chronic conditions that may contribute to a fall?:  Arthritis Do you rush to the bathroom potentially risking a fall?:  Yes   Injury History   Polypharmacy:  Yes Antidepressant Use:  No   Sedative Use:  No Antihypertensive Use:  No   Previous Fall:  No Alcohol Use:  No   Deconditioning:  No Visual Impairment:  No   Cogitive Impairment:  No Mmobility Impairment:  No   Postural Hypotension:  No Urinary Incontinence:  No       Home Safety:   Are there hazards in your environment?:  No   If you fell, would you need help to get back up from the ground?:  Yes Do you have problems or concerns getting in/out of a bed, chair, tub, or toilet?:  Yes   Do you feel unsteady when walking?:  Yes Is your activity limited by pain?:  No   Do you have handrails and grab-bars in the home?:  Yes Are emergency numbers kept by the phone and regularly updated?:  Yes   Are you and/or family members aware of the dangers of smoking in bed?:  Yes Are firearms stored securely?:  Yes   Do you have working smoke alarms and fire extinguisher?:  Yes Do all household members know how to use them?:  Yes   Have you left the stove on unsupervised?:  No    Home Safety Risk Factors   Unfamilar with surroundings:  No Uneven floors:  No   Stairs or handrail saftey risk:  Yes Loose rugs:  Yes   Household clutter:  No Poor household lighting:  No   No grab bars in bathroom:  No Further evaluation needed:  No       Advanced Directives:   Advanced Directives    Living Will:  Yes Durable POA for healthcare:   Yes   Advanced directive:  Yes    Patient's End of Life Decisions    Reviewed with patient:  Yes Provider agrees with end of life decisions:  Yes       Urinary Incontinence:   Do you have urinary incontinence?:  Yes Do you have incomplete emptying?:  Yes   Do you urinate frequently?:  Yes Do you have urinary urgency?:  Yes   Do you have urinary hesitancy?:  Yes Do you have dysuria (painful and/or difficult urination)?:  No   Do you have nocturia (waking up to urinate)?:  No Do you strain when urinating (have to push to urinate)?:  No   Do you have a weak stream when urinating?:  Yes Do you have intermittent streaming when urinating?:  Yes   Do you dribble urine after finishing?:  No    Do you have vaginal pressure?:  No Do you have vaginal dryness?:  No       Glaucoma:           VITALS:  /70 (BP Location: Left arm, Patient Position: Sitting, Cuff Size: Standard)   Pulse 59 Ht 4' 10 5" (1 486 m) Comment: w/ shoe denied off (in wheelchair)  Wt 62 kg (136 lb 9 6 oz) Comment: w/ shoe denied off (in wheelchair)  SpO2 (!) 89%   BMI 28 06 kg/m²     ASSESSMENT/PLAN:  1  Medicare annual wellness visit, subsequent    MEDICARE WELLNESS COUNSELING/DISCUSSION:  Cardiovascular screening and prevention: The risks and benefits of screening were discussed  Screening is current  Counseling was given on ways to maintain a healthy weight  Diabetes Screening/Counseling: The risks and benefits of screening were discussed  Screening is current  Counseling was given on ways to maintain a healthy weight  Colorectal Cancer Screening/Counseling: The risks and benefits of screening were discussed  Screening is not indicated   Breast Cancer Screening/Counseling: The risks and benefits of screening were discussed Screening for breast cancer is not indicated   Cervical Cancer Screening/Counseling: The risks and benefits of screening were discussed  Screening for cervical cancer is not indicated  Osteoporosis Screening/Counseling: The risks and benefits of screening were discussed  The patient declines osteoporosis screening  Influenza Immunization: AWV Influenza Counseling: Patient declines  Influenza recommended annually  Pneumococcal Immunization: Risk and benefits of immunization was discussed  Patient declines pneumococcal vaccination  Advance Directive Planning: Complete and up to date  Patient was encouraged to follow-up to discuss questions/decisions  Patient Discussion: Plan discussed with the patient  Follow-up in 1 year for subsequent wellness visit was advised         Davi Sun DO  [facility]

## 2018-07-09 NOTE — LETTER
July 9, 2018     Patient: Isabella Reddy   YOB: 1934   Date of Visit: 7/9/2018     To Whom it May Concern:    Patient is under our medical care and patient was unable to travel recently due to multiple medical problems  She has breathing difficulties and underlying COPD  She was advised by myself and her pulmonary physician not to travel  Please feel free to contact our office for any questions at [phone]  We appreciate your cooperation           Sincerely,          Ashutosh Wing, DO

## 2018-07-09 NOTE — PATIENT INSTRUCTIONS
Chronic Hypertension   AMBULATORY CARE:   Hypertension  is high blood pressure (BP)  Your BP is the force of your blood moving against the walls of your arteries  Normal BP is less than 120/80  Prehypertension is between 120/80 and 139/89  Hypertension is 140/90 or higher  Hypertension causes your BP to get so high that your heart has to work much harder than normal  This can damage your heart  Chronic hypertension is a long-term condition that you can control with a healthy lifestyle or medicines  A controlled blood pressure helps protect your organs, such as your heart, lungs, brain, and kidneys  Common symptoms include the following:   · Headache     · Blurred vision    · Chest pain     · Dizziness or weakness     · Trouble breathing     · Nosebleeds  Call 911 for any of the following:   · You have discomfort in your chest that feels like squeezing, pressure, fullness, or pain  · You become confused or have difficulty speaking  · You suddenly feel lightheaded or have trouble breathing  · You have pain or discomfort in your back, neck, jaw, stomach, or arm  Seek care immediately if:   · You have a severe headache or vision loss  · You have weakness in an arm or leg  Contact your healthcare provider if:   · You feel faint, dizzy, confused, or drowsy  · You have been taking your BP medicine and your BP is still higher than your healthcare provider says it should be  · You have questions or concerns about your condition or care  Treatment for chronic hypertension  may include medicine to lower your BP and lower your cholesterol level  A low cholesterol level helps prevent heart disease and makes it easier to control your blood pressure  Heart disease can make your blood pressure harder to control  You may also need to make lifestyle changes  Take your medicine exactly as directed    Manage chronic hypertension:  Talk with your healthcare provider about these and other ways to manage hypertension:  · Take your BP at home  Sit and rest for 5 minutes before you take your BP  Extend your arm and support it on a flat surface  Your arm should be at the same level as your heart  Follow the directions that came with your BP monitor  If possible, take at least 2 BP readings each time  Take your BP at least twice a day at the same times each day, such as morning and evening  Keep a record of your BP readings and bring it to your follow-up visits  Ask your healthcare provider what your blood pressure should be  · Limit sodium (salt) as directed  Too much sodium can affect your fluid balance  Check labels to find low-sodium or no-salt-added foods  Some low-sodium foods use potassium salts for flavor  Too much potassium can also cause health problems  Your healthcare provider will tell you how much sodium and potassium are safe for you to have in a day  He or she may recommend that you limit sodium to 2,300 mg a day  · Follow the meal plan recommended by your healthcare provider  A dietitian or your provider can give you more information on low-sodium plans or the DASH (Dietary Approaches to Stop Hypertension) eating plan  The DASH plan is low in sodium, unhealthy fats, and total fat  It is high in potassium, calcium, and fiber  · Exercise to maintain a healthy weight  Exercise at least 30 minutes per day, on most days of the week  This will help decrease your blood pressure  Ask about the best exercise plan for you  · Decrease stress  This may help lower your BP  Learn ways to relax, such as deep breathing or listening to music  · Limit alcohol  Women should limit alcohol to 1 drink a day  Men should limit alcohol to 2 drinks a day  A drink of alcohol is 12 ounces of beer, 5 ounces of wine, or 1½ ounces of liquor  · Do not smoke  Nicotine and other chemicals in cigarettes and cigars can increase your BP and also cause lung damage   Ask your healthcare provider for information if you currently smoke and need help to quit  E-cigarettes or smokeless tobacco still contain nicotine  Talk to your healthcare provider before you use these products  Follow up with your healthcare provider as directed: You will need to return to have your BP checked and to have other lab tests done  Write down your questions so you remember to ask them during your visits  © 2017 [address] [employer] Information is for End User's use only and may not be sold, redistributed or otherwise used for commercial purposes  All illustrations and images included in CareNotes® are the copyrighted property of A D A M , Inc  or Israel Bruno  The above information is an  only  It is not intended as medical advice for individual conditions or treatments  Talk to your doctor, nurse or pharmacist before following any medical regimen to see if it is safe and effective for you  Urinary Incontinence   WHAT YOU NEED TO KNOW:   What is urinary incontinence? Urinary incontinence (UI) is when you lose control of your bladder  What causes UI? UI occurs because your bladder cannot store or empty urine properly  The following are the most common types of UI:  · Stress incontinence  is when you leak urine due to increased bladder pressure  This may happen when you cough, sneeze, or exercise  · Urge incontinence  is when you feel the need to urinate right away and leak urine accidentally  · Mixed incontinence  is when you have both stress and urge UI  What are the signs and symptoms of UI?   · You feel like your bladder does not empty completely when you urinate  · You urinate often and need to urinate immediately  · You leak urine when you sleep, or you wake up with the urge to urinate  · You leak urine when you cough, sneeze, exercise, or laugh  How is UI diagnosed?   Your healthcare provider will ask how often you leak urine and whether you have stress or urge symptoms  Tell him which medicines you take, how often you urinate, and how much liquid you drink each day  You may need any of the following tests:  · Urine tests  may show infection or kidney function  · A pelvic exam  may be done to check for blockages  A pelvic exam will also show if your bladder, uterus, or other organs have moved out of place  · An x-ray, ultrasound, or CT  may show problems with parts of your urinary system  You may be given contrast liquid to help your organs show up better in the pictures  Tell the healthcare provider if you have ever had an allergic reaction to contrast liquid  Do not enter the MRI room with anything metal  Metal can cause serious injury  Tell the healthcare provider if you have any metal in or on your body  · A bladder scan  will show how much urine is left in your bladder after you urinate  You will be asked to urinate and then healthcare providers will use a small ultrasound machine to check the urine left in your bladder  · Cystometry  is used to check the function of your urinary system  Your healthcare provider checks the pressure in your bladder while filling it with fluid  Your bladder pressure may also be tested when your bladder is full and while you urinate  How is UI treated? · Medicines  can help strengthen your bladder control  · Electrical stimulation  is used to send a small amount of electrical energy to your pelvic floor muscles  This helps control your bladder function  Electrodes may be placed outside your body or in your rectum  For women, the electrodes may be placed in the vagina  · A bulking agent  may be injected into the wall of your urethra to make it thicker  This helps keep your urethra closed and decreases urine leakage  · Devices  such as a clamp, pessary, or tampon may help stop urine leaks  Ask your healthcare provider for more information about these and other devices       · Surgery  may be needed if other treatments do not work  Several types of surgery can help improve your bladder control  Ask your healthcare provider for more information about the surgery you may need  How can I manage my symptoms? · Do pelvic muscle exercises often  Your pelvic muscles help you stop urinating  Squeeze these muscles tight for 5 seconds, then relax for 5 seconds  Gradually work up to squeezing for 10 seconds  Do 3 sets of 15 repetitions a day, or as directed  This will help strengthen your pelvic muscles and improve bladder control  · A catheter  may be used to help empty your bladder  A catheter is a tiny, plastic tube that is put into your bladder to drain your urine  Your healthcare provider may tell you to use a catheter to prevent your bladder from getting too full and leaking urine  · Keep a UI record  Write down how often you leak urine and how much you leak  Make a note of what you were doing when you leaked urine  · Train your bladder  Go to the bathroom at set times, such as every 2 hours, even if you do not feel the urge to go  You can also try to hold your urine when you feel the urge to go  For example, hold your urine for 5 minutes when you feel the urge to go  As that becomes easier, hold your urine for 10 minutes  · Drink liquids as directed  Ask your healthcare provider how much liquid to drink each day and which liquids are best for you  You may need to limit the amount of liquid you drink to help control your urine leakage  Limit or do not have drinks that contain caffeine or alcohol  Do not drink any liquid right before you go to bed  · Prevent constipation  Eat a variety of high-fiber foods  Good examples are high-fiber cereals, beans, vegetables, and whole-grain breads  Prune juice may help make your bowel movement softer  Walking is the best way to trigger your intestines to have a bowel movement  · Exercise regularly and maintain a healthy weight    Ask your healthcare provider how much you should weigh and about the best exercise plan for you  Weight loss and exercise will decrease pressure on your bladder and help you control your leakage  Ask him to help you create a weight loss plan if you are overweight  When should I seek immediate care? · You have severe pain  · You are confused or cannot think clearly  When should I contact my healthcare provider? · You have a fever  · You see blood in your urine  · You have pain when you urinate  · You have new or worse pain, even after treatment  · Your mouth feels dry or you have vision changes  · Your urine is cloudy or smells bad  · You have questions or concerns about your condition or care  CARE AGREEMENT:   You have the right to help plan your care  Learn about your health condition and how it may be treated  Discuss treatment options with your caregivers to decide what care you want to receive  You always have the right to refuse treatment  The above information is an  only  It is not intended as medical advice for individual conditions or treatments  Talk to your doctor, nurse or pharmacist before following any medical regimen to see if it is safe and effective for you  © 2017 [facility] Information is for End User's use only and may not be sold, redistributed or otherwise used for commercial purposes  All illustrations and images included in CareNotes® are the copyrighted property of A D A [employer]  or Israel Bruno  Cigarette Smoking and Your Health   AMBULATORY CARE:   Risks to your health if you smoke:  Nicotine and other chemicals found in tobacco damage every cell in your body  Even if you are a light smoker, you have an increased risk for cancer, heart disease, and lung disease  If you are pregnant or have diabetes, smoking increases your risk for complications     Benefits to your health if you stop smoking:   · You decrease respiratory symptoms such as coughing, wheezing, and shortness of breath  · You reduce your risk for cancers of the lung, mouth, throat, kidney, bladder, pancreas, stomach, and cervix  If you already have cancer, you increase the benefits of chemotherapy  You also reduce your risk for cancer returning or a second cancer from developing  · You reduce your risk for heart disease, blood clots, heart attack, and stroke  · You reduce your risk for lung infections, and diseases such as pneumonia, asthma, chronic bronchitis, and emphysema  · Your circulation improves  More oxygen can be delivered to your body  If you have diabetes, you lower your risk for complications, such as kidney, artery, and eye diseases  You also lower your risk for nerve damage  Nerve damage can lead to amputations, poor vision, and blindness  · You improve your body's ability to heal and to fight infections  Benefits to the health of others if you stop smoking:  Tobacco is harmful to nonsmokers who breathe in your secondhand smoke  The following are ways the health of others around you may improve when you stop smoking:  · You lower the risks for lung cancer and heart disease in nonsmoking adults  · If you are pregnant, you lower the risk for miscarriage, early delivery, low birth weight, and stillbirth  You also lower your baby's risk for SIDS, obesity, developmental delay, and neurobehavioral problems, such as ADHD  · If you have children, you lower their risk for ear infections, colds, pneumonia, bronchitis, and asthma  For more information and support to stop smoking:   · Smokefree  gov  Phone: 5- 509 - 020-7646  Web Address: www smokefree  gov  Follow up with your healthcare provider as directed:  Write down your questions so you remember to ask them during your visits  © 2017 [employer] Information is for End User's use only and may not be sold, redistributed or otherwise used for commercial purposes   All illustrations and images included in CareNotes® are the copyrighted property of A D A M , Inc  or Israel Bruno  The above information is an  only  It is not intended as medical advice for individual conditions or treatments  Talk to your doctor, nurse or pharmacist before following any medical regimen to see if it is safe and effective for you  Fall Prevention   AMBULATORY CARE:   Fall prevention  includes ways to make your home and other areas safer  It also includes ways you can move more carefully to prevent a fall  Health conditions that cause changes in your blood pressure, vision, or muscle strength and coordination may increase your risk for falls  Medicines may also increase your risk for falls if they make you dizzy, weak, or sleepy  Call 911 or have someone else call if:   · You have fallen and are unconscious  · You have fallen and cannot move part of your body  Contact your healthcare provider if:   · You have fallen and have pain or a headache  · You have questions or concerns about your condition or care  Fall prevention tips:   · Stand or sit up slowly  This may help you keep your balance and prevent falls  · Use assistive devices as directed  Your healthcare provider may suggest that you use a cane or walker to help you keep your balance  You may need to have grab bars put in your bathroom near the toilet or in the shower  · Wear shoes that fit well and have soles that   Wear shoes both inside and outside  Use slippers with good   Do not wear shoes with high heels  · Wear a personal alarm  This is a device that allows you to call 911 if you fall and need help  Ask your healthcare provider for more information  · Stay active  Exercise can help strengthen your muscles and improve your balance  Your healthcare provider may recommend water aerobics or walking  He or she may also recommend physical therapy to improve your coordination   Never start an exercise program without talking to your healthcare provider first      · Manage your medical conditions  Keep all appointments with your healthcare providers  Visit your eye doctor as directed  Home safety tips:   · Add items to prevent falls in the bathroom  Put nonslip strips on your bath or shower floor to prevent you from slipping  Use a bath mat if you do not have carpet in the bathroom  This will prevent you from falling when you step out of the bath or shower  Use a shower seat so you do not need to stand while you shower  Sit on the toilet or a chair in your bathroom to dry yourself and put on clothing  This will prevent you from losing your balance from drying or dressing yourself while you are standing  · Keep paths clear  Remove books, shoes, and other objects from walkways and stairs  Place cords for telephones and lamps out of the way so that you do not need to walk over them  Tape them down if you cannot move them  Remove small rugs  If you cannot remove a rug, secure it with double-sided tape  This will prevent you from tripping  · Install bright lights in your home  Use night lights to help light paths to the bathroom or kitchen  Always turn on the light before you start walking  · Keep items you use often on shelves within reach  Do not use a step stool to help you reach an item  · Paint or place reflective tape on the edges of your stairs  This will help you see the stairs better  Follow up with your healthcare provider as directed:  Write down your questions so you remember to ask them during your visits  © 2017 [address] [employer] Information is for End User's use only and may not be sold, redistributed or otherwise used for commercial purposes  All illustrations and images included in CareNotes® are the copyrighted property of A D A "[employer] , [employer]  or Israel Bruno  The above information is an  only   It is not intended as medical advice for individual conditions or treatments  Talk to your doctor, nurse or pharmacist before following any medical regimen to see if it is safe and effective for you  Advance Directives   WHAT YOU NEED TO KNOW:   What are advance directives? Advance directives are legal documents that state your wishes and plans for medical care  These plans are made ahead of time in case you lose your ability to make decisions for yourself  Advance directives can apply to any medical decision, such as the treatments you want, and if you want to donate organs  What are the types of advance directives? There are many types of advance directives, and each state has rules about how to use them  You may choose a combination of any of the following:  · Living will: This is a written record of the treatment you want  You can also choose which treatments you do not want, which to limit, and which to stop at a certain time  This includes surgery, medicine, IV fluid, and tube feedings  · Durable power of  for healthcare [facility]): This is a written record that states who you want to make healthcare choices for you when you are unable to make them for yourself  This person, called a proxy, is usually a family member or a friend  You may choose more than 1 proxy  · Do not resuscitate (DNR) order:  A DNR order is used in case your heart stops beating or you stop breathing  It is a request not to have certain forms of treatment, such as CPR  A DNR order may be included in other types of advance directives  · Medical directive: This covers the care that you want if you are in a coma, near death, or unable to make decisions for yourself  You can list the treatments you want for each condition  Treatment may include pain medicine, surgery, blood transfusions, dialysis, IV or tube feedings, and a ventilator (breathing machine)  · Values history:   This document has questions about your views, beliefs, and how you feel and think about life  This information can help others choose the care that you would choose  Why are advance directives important? An advance directive helps you control your care  Although spoken wishes may be used, it is better to have your wishes written down  Spoken wishes can be misunderstood, or not followed  Treatments may be given even if you do not want them  An advance directive may make it easier for your family to make difficult choices about your care  How do I decide what to put in my advance directives? · Make informed decisions:  Make sure you fully understand treatments or care you may receive  Think about the benefits and problems your decisions could cause for you or your family  Talk to healthcare providers if you have concerns or questions before you write down your wishes  You may also want to talk with your [employer] or , or a   Check your state laws to make sure that what you put in your advance directive is legal      · Sign all forms:  Sign and date your advance directive when you have finished  You may also need 2 witnesses to sign the forms  Witnesses cannot be your doctor or his staff, your spouse, heirs or beneficiaries, people you owe money to, or your chosen proxy  Talk to your family, proxy, and healthcare providers about your advance directive  Give each person a copy, and keep one for yourself in a place you can get to easily  Do not keep it hidden or locked away  · Review and revise your plans: You can revise your advance directive at any time, as long as you are able to make decisions  Review your plan every year, and when there are changes in your life, or your health  When you make changes, let your family, proxy, and healthcare providers know  Give each a new copy  Where can I find more information?   · [employer]  [address] , [address]  Phone: 5- 920 - 630-3927  Phone: 6- 063 - [phone]  Web Address: http://www  aafp org  · [address] [facility])  [address], [address] [facility] , [address] [place] [address]  Phone: [phone]  Phone: [phone]  Web Address: Harley snyder  CARE AGREEMENT:   You have the right to help plan your care  To help with this plan, you must learn about your health condition and treatment options  You must also learn about advance directives and how they are used  Work with your healthcare providers to decide what care will be used to treat you  You always have the right to refuse treatment  The above information is an  only  It is not intended as medical advice for individual conditions or treatments  Talk to your doctor, nurse or pharmacist before following any medical regimen to see if it is safe and effective for you  © 2017 [address] [employer] Information is for End User's use only and may not be sold, redistributed or otherwise used for commercial purposes  All illustrations and images included in CareNotes® are the copyrighted property of A D A M , Inc  or Israel Bruno

## 2018-07-10 ENCOUNTER — ANTICOAG VISIT (OUTPATIENT)
Dept: CARDIOLOGY CLINIC | Facility: CLINIC | Age: 83
End: 2018-07-10

## 2018-07-10 DIAGNOSIS — I48.91 ATRIAL FIBRILLATION, UNSPECIFIED TYPE (HCC): ICD-10-CM

## 2018-07-10 NOTE — PROGRESS NOTES
Per JT Hold 1 day resume same dose, recheck 2 weeks  Left detailed message for patient  Advised to call back if any questions

## 2018-07-12 DIAGNOSIS — I10 ESSENTIAL HYPERTENSION: Primary | ICD-10-CM

## 2018-07-13 RX ORDER — QUINAPRIL 20 MG/1
TABLET ORAL
Qty: 135 TABLET | Refills: 3 | Status: SHIPPED | OUTPATIENT
Start: 2018-07-13

## 2018-07-25 ENCOUNTER — APPOINTMENT (OUTPATIENT)
Dept: LAB | Facility: CLINIC | Age: 83
End: 2018-07-25
Payer: MEDICARE

## 2018-07-25 ENCOUNTER — TELEPHONE (OUTPATIENT)
Dept: CARDIOLOGY CLINIC | Facility: CLINIC | Age: 83
End: 2018-07-25

## 2018-07-25 ENCOUNTER — ANTICOAG VISIT (OUTPATIENT)
Dept: CARDIOLOGY CLINIC | Facility: CLINIC | Age: 83
End: 2018-07-25

## 2018-07-25 DIAGNOSIS — I48.91 ATRIAL FIBRILLATION, UNSPECIFIED TYPE (HCC): ICD-10-CM

## 2018-07-25 NOTE — PROGRESS NOTES
Per AL - Hold for one day, then same dose   Recheck in 3 weeks     S/w patient and verbally understood

## 2018-08-20 ENCOUNTER — APPOINTMENT (OUTPATIENT)
Dept: LAB | Facility: CLINIC | Age: 83
End: 2018-08-20
Payer: MEDICARE

## 2018-08-20 ENCOUNTER — ANTICOAG VISIT (OUTPATIENT)
Dept: CARDIOLOGY CLINIC | Facility: CLINIC | Age: 83
End: 2018-08-20

## 2018-08-20 DIAGNOSIS — I48.91 ATRIAL FIBRILLATION, UNSPECIFIED TYPE (HCC): ICD-10-CM

## 2018-08-20 NOTE — PROGRESS NOTES
Per AL - Hold 1 day then same dose 2-3 weeks   S/w pt and verbally understood instructions   Pt stated she'll have INR checked in 3 weeks

## 2018-08-31 DIAGNOSIS — G47.00 INSOMNIA, UNSPECIFIED TYPE: ICD-10-CM

## 2018-08-31 RX ORDER — ZOLPIDEM TARTRATE 10 MG/1
10 TABLET ORAL
Qty: 90 TABLET | Refills: 1 | Status: SHIPPED | OUTPATIENT
Start: 2018-08-31

## 2018-09-28 ENCOUNTER — ANTICOAG VISIT (OUTPATIENT)
Dept: CARDIOLOGY CLINIC | Facility: CLINIC | Age: 83
End: 2018-09-28

## 2018-09-28 ENCOUNTER — TELEPHONE (OUTPATIENT)
Dept: CARDIOLOGY CLINIC | Facility: CLINIC | Age: 83
End: 2018-09-28

## 2018-09-28 ENCOUNTER — APPOINTMENT (OUTPATIENT)
Dept: LAB | Facility: CLINIC | Age: 83
End: 2018-09-28
Payer: MEDICARE

## 2018-09-28 NOTE — PROGRESS NOTES
Spoke with patient telling her to hold coumadin for 3 days, then resume 1 5 mg qd and recheck in 2 weeks  Pt  Verbalized understanding

## 2018-09-28 NOTE — TELEPHONE ENCOUNTER
Spoke with patient telling her we can set her up with [facility]  Filled out paperwork, will fax to [facility] after doctor signature

## 2018-09-28 NOTE — TELEPHONE ENCOUNTER
Patient's wife called & would like to know if there is any way that she can start having her blood work done at home  Patient would like a call back

## 2018-10-02 DIAGNOSIS — I48.0 PAROXYSMAL ATRIAL FIBRILLATION (HCC): ICD-10-CM

## 2018-10-02 RX ORDER — WARFARIN SODIUM 1 MG/1
TABLET ORAL
Qty: 90 TABLET | Refills: 1 | Status: SHIPPED | OUTPATIENT
Start: 2018-10-02 | End: 2018-10-15 | Stop reason: SDUPTHER

## 2018-10-15 ENCOUNTER — OFFICE VISIT (OUTPATIENT)
Dept: PULMONOLOGY | Facility: CLINIC | Age: 83
End: 2018-10-15
Payer: MEDICARE

## 2018-10-15 VITALS — SYSTOLIC BLOOD PRESSURE: 140 MMHG | OXYGEN SATURATION: 91 % | DIASTOLIC BLOOD PRESSURE: 80 MMHG | HEART RATE: 60 BPM

## 2018-10-15 DIAGNOSIS — I27.21 PULMONARY ARTERY HYPERTENSION (HCC): ICD-10-CM

## 2018-10-15 DIAGNOSIS — J43.2 CENTRILOBULAR EMPHYSEMA (HCC): ICD-10-CM

## 2018-10-15 DIAGNOSIS — J41.0 SIMPLE CHRONIC BRONCHITIS (HCC): Primary | ICD-10-CM

## 2018-10-15 PROCEDURE — 90662 IIV NO PRSV INCREASED AG IM: CPT

## 2018-10-15 PROCEDURE — G0008 ADMIN INFLUENZA VIRUS VAC: HCPCS

## 2018-10-15 PROCEDURE — 99214 OFFICE O/P EST MOD 30 MIN: CPT | Performed by: INTERNAL MEDICINE

## 2018-10-15 NOTE — PROGRESS NOTES
Assessment/Plan:   Diagnoses and all orders for this visit:    Simple chronic bronchitis ([address] )  -     influenza vaccine, [id number], high-dose, PF 0 5 mL, for patients 65 yr+ (FLUZONE HIGH-DOSE)    Centrilobular emphysema (HCC)    Pulmonary artery hypertension (HCC)        Shortness of breath, likely secondary to her chronic lung disease, with centrilobular emphysema, and pulmonary hypertension  Spirometry done in the office last visit with FEV1 of 53% but she mainly has restriction with decreased FVC and FEV1 likely secondary to her kyphoscoliosis  No recent ER visits or hospitalizations for chronic bronchitis and flare up  She has been using her Brovana twice a day and using her albuterol ipratropium once a day, she states that she occasionally 2 or 3 times in a week needs the second dose of that albuterol ipratropium and does feel better with that with decreased wheezing I will send him an order 2 [facility] from where she gets the medications with regards to that  She does have nocturnal oxygen 2 L/m to use it at nighttime  She will continue  Recommend flu shot  She has not had the flu shot in the past 3-4 years, although the last flu shot was in 2019 end 2010 she didn't get it  Risks and benefits of the flu shot discussed with the patient understands and verbalizes  Flu shot today  Follow-up in 6 months or when necessary earlier as needed  No Follow-up on file  All questions are answered to the patient's satisfaction and understanding  She verbalizes understanding  She is encouraged to call with any further questions or concerns  Portions of the record may have been created with voice recognition software  Occasional wrong word or "sound a like" substitutions may have occurred due to the inherent limitations of voice recognition software  Read the chart carefully and recognize, using context, where substitutions have occurred      Electronically Signed by Nakul White MD    ______________________________________________________________________    Chief Complaint:   Chief Complaint   Patient presents with    Emphysema     fup       Patient ID: Mamie Palencia is a 80 y o  y o  female has a past medical history of A-fib ([address] ); Anxiety; Atrial fibrillation ([address] ); Chronic obstructive lung disease ([address] ); COPD (chronic obstructive pulmonary disease) ([address] ); Emphysema lung ([facility] [address] [facility] ); Former tobacco use; Fracture of hip ([facility] [address] ); Generalized osteoarthritis; HTN (hypertension); Hyperlipidemia; Osteoarthritis; Personal history of kyphosis; Personal history of pulmonary hypertension; Primary pulmonary hypertension ([facility] [address] ); Restrictive lung disease; Shortness of breath; and Venous insufficiency (chronic) (peripheral)  10/15/2018  Patient presents today for follow-up visit  Patient is an 81 yo female former smoker who quit many years ago with PMH of COPD, CAD, AFib, pulmonary HTN  She is here today for follow up, she has not been seen since 2015  Overall has been doing well with her breathing, has not had any exacerbations or hospitalizations for her breathing    She does move around some with a walker, her functional status has been stable  She has been using the brovana twice per day and ipratropiumalbtuerol usually once in between  No cough  2-3 times in a week she states she has some occasional wheezing between and does use the albuterol ipratropium the second time and feels much better  Review of Systems   Constitutional: Positive for fatigue  Negative for activity change, appetite change, chills, diaphoresis, fever and unexpected weight change  HENT: Positive for postnasal drip  Negative for congestion, dental problem, drooling, ear discharge, ear pain, nosebleeds, rhinorrhea, sinus pain, sinus pressure, sore throat and voice change  Eyes: Negative for pain, discharge, itching and visual disturbance  Respiratory: Positive for cough and shortness of breath   Negative for apnea, choking, chest tightness, wheezing and stridor  Cardiovascular: Negative for chest pain, palpitations and leg swelling  Gastrointestinal: Negative for abdominal pain, blood in stool, constipation, diarrhea and vomiting  Endocrine: Negative for cold intolerance, heat intolerance, polydipsia, polyphagia and polyuria  Genitourinary: Negative for difficulty urinating and dysuria  Musculoskeletal: Negative for arthralgias and neck pain  Skin: Negative for pallor and rash  Allergic/Immunologic: Negative for environmental allergies, food allergies and immunocompromised state  Neurological: Negative for dizziness, facial asymmetry, speech difficulty, weakness and light-headedness  Hematological: Negative for adenopathy  Does not bruise/bleed easily  Psychiatric/Behavioral: Negative for agitation, confusion and sleep disturbance  The patient is not nervous/anxious  Smoking history: She reports that she quit smoking about 25 years ago  Her smoking use included Cigarettes   She has never used smokeless tobacco     The following portions of the patient's history were reviewed and updated as appropriate: allergies, current medications, past family history, past medical history, past social history, past surgical history and problem list     Immunization History   Administered Date(s) Administered    Influenza 10/13/2009, 10/15/2010    Influenza, high dose seasonal 0 5 mL 10/15/2018     Current Outpatient Prescriptions   Medication Sig Dispense Refill    arformoterol (BROVANA) 15 mcg/2 mL nebulizer solution Take 1 vial (15 mcg total) by nebulization 2 (two) times a day 540 mL 3    atorvastatin (LIPITOR) 10 mg tablet Take 1 tablet (10 mg total) by mouth daily 90 tablet 1    digoxin (LANOXIN) 0 125 mg tablet TAKE 1 TABLET DAILY 90 tablet 3    furosemide (LASIX) 20 mg tablet Take 1 tablet (20 mg total) by mouth 2 (two) times a day 180 tablet 0    ipratropium-albuterol (DUO-NEB) 0 5-2 5 mg/3 mL nebulizer solution ONE VIAL VIA NEBULIZER ONCE DAILY 300 mL 4    metoprolol succinate (TOPROL-XL) 50 mg 24 hr tablet Take 1 tablet (50 mg total) by mouth daily 90 tablet 2    potassium chloride (K-DUR,KLOR-CON) 20 mEq tablet Take 1 tablet (20 mEq total) by mouth daily 90 tablet 3    quinapril (ACCUPRIL) 20 mg tablet TAKE ONE AND ONE-HALF TABLETS DAILY 135 tablet 3    warfarin (COUMADIN) 1 mg tablet Take 1 tab(1mg) or 1 5 tabs (1 5mg) daily as directed by provider  45 tablet 11    warfarin (COUMADIN) 2 mg tablet Take 1-2 tablets (2-4 mg total) by mouth daily 180 tablet 3    zolpidem (AMBIEN) 10 mg tablet Take 1 tablet (10 mg total) by mouth daily at bedtime as needed for sleep 90 tablet 1    albuterol (2 5 mg/3 mL) 0 083 % nebulizer solution Inhale       No current facility-administered medications for this visit  Allergies: Patient has no known allergies  Objective:  Vitals:    10/15/18 1034   BP: 140/80   Pulse: 60   SpO2: 91%   Oxygen Therapy  SpO2: 91 %    Wt Readings from Last 3 Encounters:   07/09/18 62 kg (136 lb 9 6 oz)   05/10/18 62 6 kg (138 lb)   04/12/18 60 3 kg (133 lb)     There is no height or weight on file to calculate BMI  Physical Exam   Constitutional: She is oriented to person, place, and time  She appears well-developed and well-nourished  HENT:   Head: Normocephalic and atraumatic  Eyes: Pupils are equal, round, and reactive to light  EOM are normal    Neck: Normal range of motion  Neck supple  Cardiovascular: Normal rate and regular rhythm  Pulmonary/Chest: Effort normal  She has no wheezes (occ expiratory rhonchi)  She has no rales  She exhibits no tenderness  Musculoskeletal: Normal range of motion  Neurological: She is alert and oriented to person, place, and time  Skin: Skin is warm and dry  Psychiatric: She has a normal mood and affect   Her behavior is normal

## 2018-10-19 DIAGNOSIS — I48.0 PAROXYSMAL ATRIAL FIBRILLATION (HCC): ICD-10-CM

## 2018-10-22 RX ORDER — WARFARIN SODIUM 1 MG/1
TABLET ORAL
Qty: 90 TABLET | Refills: 1 | Status: SHIPPED | OUTPATIENT
Start: 2018-10-22

## 2018-10-25 ENCOUNTER — APPOINTMENT (OUTPATIENT)
Dept: LAB | Facility: CLINIC | Age: 83
End: 2018-10-25
Payer: MEDICARE

## 2018-10-25 ENCOUNTER — TELEPHONE (OUTPATIENT)
Dept: CARDIOLOGY CLINIC | Facility: CLINIC | Age: 83
End: 2018-10-25

## 2018-10-25 DIAGNOSIS — I10 ESSENTIAL HYPERTENSION: ICD-10-CM

## 2018-10-25 DIAGNOSIS — I48.0 PAROXYSMAL ATRIAL FIBRILLATION (HCC): Primary | ICD-10-CM

## 2018-10-25 LAB
INR PPP: 3.32 (ref 0.86–1.17)
PROTHROMBIN TIME: 33.7 SECONDS (ref 11.8–14.2)

## 2018-10-25 PROCEDURE — 85610 PROTHROMBIN TIME: CPT

## 2018-10-25 PROCEDURE — 36415 COLL VENOUS BLD VENIPUNCTURE: CPT

## 2018-10-25 RX ORDER — METOPROLOL SUCCINATE 50 MG/1
TABLET, EXTENDED RELEASE ORAL
Qty: 90 TABLET | Refills: 2 | Status: SHIPPED | OUTPATIENT
Start: 2018-10-25

## 2018-10-26 ENCOUNTER — ANTICOAG VISIT (OUTPATIENT)
Dept: CARDIOLOGY CLINIC | Facility: CLINIC | Age: 83
End: 2018-10-26

## 2018-10-26 DIAGNOSIS — I48.91 ATRIAL FIBRILLATION, UNSPECIFIED TYPE (HCC): ICD-10-CM

## 2018-11-03 DIAGNOSIS — E78.2 MIXED HYPERLIPIDEMIA: ICD-10-CM

## 2018-11-04 RX ORDER — ATORVASTATIN CALCIUM 10 MG/1
TABLET, FILM COATED ORAL
Qty: 90 TABLET | Refills: 1 | Status: SHIPPED | OUTPATIENT
Start: 2018-11-04

## 2018-11-13 ENCOUNTER — ANTICOAG VISIT (OUTPATIENT)
Dept: CARDIOLOGY CLINIC | Facility: CLINIC | Age: 83
End: 2018-11-13

## 2018-11-13 ENCOUNTER — APPOINTMENT (OUTPATIENT)
Dept: LAB | Facility: CLINIC | Age: 83
End: 2018-11-13
Payer: MEDICARE

## 2018-11-13 DIAGNOSIS — I10 ESSENTIAL HYPERTENSION: ICD-10-CM

## 2018-11-13 DIAGNOSIS — I48.91 ATRIAL FIBRILLATION, UNSPECIFIED TYPE (HCC): ICD-10-CM

## 2018-11-13 DIAGNOSIS — J43.9 PULMONARY EMPHYSEMA, UNSPECIFIED EMPHYSEMA TYPE (HCC): Chronic | ICD-10-CM

## 2018-11-13 LAB
ALBUMIN SERPL BCP-MCNC: 3.6 G/DL (ref 3.5–5)
ALP SERPL-CCNC: 79 U/L (ref 46–116)
ALT SERPL W P-5'-P-CCNC: 18 U/L (ref 12–78)
ANION GAP SERPL CALCULATED.3IONS-SCNC: 3 MMOL/L (ref 4–13)
AST SERPL W P-5'-P-CCNC: 19 U/L (ref 5–45)
BILIRUB SERPL-MCNC: 0.74 MG/DL (ref 0.2–1)
BUN SERPL-MCNC: 27 MG/DL (ref 5–25)
CALCIUM SERPL-MCNC: 9.1 MG/DL (ref 8.3–10.1)
CHLORIDE SERPL-SCNC: 105 MMOL/L (ref 100–108)
CO2 SERPL-SCNC: 34 MMOL/L (ref 21–32)
CREAT SERPL-MCNC: 0.64 MG/DL (ref 0.6–1.3)
ERYTHROCYTE [DISTWIDTH] IN BLOOD BY AUTOMATED COUNT: 14.1 % (ref 11.6–15.1)
GFR SERPL CREATININE-BSD FRML MDRD: 82 ML/MIN/1.73SQ M
GLUCOSE SERPL-MCNC: 88 MG/DL (ref 65–140)
HCT VFR BLD AUTO: 40.3 % (ref 34.8–46.1)
HGB BLD-MCNC: 12.2 G/DL (ref 11.5–15.4)
MCH RBC QN AUTO: 29.6 PG (ref 26.8–34.3)
MCHC RBC AUTO-ENTMCNC: 30.3 G/DL (ref 31.4–37.4)
MCV RBC AUTO: 98 FL (ref 82–98)
PLATELET # BLD AUTO: 119 THOUSANDS/UL (ref 149–390)
PMV BLD AUTO: 10.4 FL (ref 8.9–12.7)
POTASSIUM SERPL-SCNC: 4.7 MMOL/L (ref 3.5–5.3)
PROT SERPL-MCNC: 7.4 G/DL (ref 6.4–8.2)
RBC # BLD AUTO: 4.12 MILLION/UL (ref 3.81–5.12)
SODIUM SERPL-SCNC: 142 MMOL/L (ref 136–145)
TSH SERPL DL<=0.05 MIU/L-ACNC: 1.14 UIU/ML (ref 0.36–3.74)
WBC # BLD AUTO: 4.07 THOUSAND/UL (ref 4.31–10.16)

## 2018-11-13 PROCEDURE — 84443 ASSAY THYROID STIM HORMONE: CPT

## 2018-11-13 PROCEDURE — 36415 COLL VENOUS BLD VENIPUNCTURE: CPT

## 2018-11-13 PROCEDURE — 85027 COMPLETE CBC AUTOMATED: CPT

## 2018-11-13 PROCEDURE — 80053 COMPREHEN METABOLIC PANEL: CPT

## 2018-11-13 NOTE — PROGRESS NOTES
S/w pt and verbally understood instructions  Pt stated that it is hard to get to lab and will try to go next Wednesday or Thursday  Per AS - 1 mg / 1 5 mg alternating  Recheck in one week

## 2018-11-16 ENCOUNTER — TELEPHONE (OUTPATIENT)
Dept: CARDIOLOGY CLINIC | Facility: CLINIC | Age: 83
End: 2018-11-16

## 2018-11-16 ENCOUNTER — TRANSCRIBE ORDERS (OUTPATIENT)
Dept: LAB | Facility: HOSPITAL | Age: 83
End: 2018-11-16

## 2018-11-16 DIAGNOSIS — I48.91 ATRIAL FIBRILLATION, UNSPECIFIED TYPE (HCC): Primary | ICD-10-CM

## 2018-11-16 NOTE — TELEPHONE ENCOUNTER
PT SAID DR BLOOM WANTED PT TO GET BW FOR INR DONE NEXT WEEK  PT  FELL AND ITS HARD FOR THEM TO GO OUT  PT WANTS TO KNOW IF SOMEONE CAN GO TO PT HOME TO DO BW  PLEASE CALL PT   Prakash Cornell

## 2018-11-21 ENCOUNTER — ANTICOAG VISIT (OUTPATIENT)
Dept: CARDIOLOGY CLINIC | Facility: CLINIC | Age: 83
End: 2018-11-21

## 2018-11-21 ENCOUNTER — APPOINTMENT (OUTPATIENT)
Dept: LAB | Facility: HOSPITAL | Age: 83
End: 2018-11-21
Attending: INTERNAL MEDICINE
Payer: MEDICARE

## 2018-11-21 DIAGNOSIS — I48.91 ATRIAL FIBRILLATION, UNSPECIFIED TYPE (HCC): ICD-10-CM

## 2018-11-21 LAB — VENIPUNCTURE: NORMAL

## 2018-11-23 ENCOUNTER — TRANSCRIBE ORDERS (OUTPATIENT)
Dept: LAB | Facility: HOSPITAL | Age: 83
End: 2018-11-23

## 2018-11-23 DIAGNOSIS — I48.0 PAROXYSMAL ATRIAL FIBRILLATION (HCC): Primary | ICD-10-CM

## 2018-12-05 ENCOUNTER — ANTICOAG VISIT (OUTPATIENT)
Dept: CARDIOLOGY CLINIC | Facility: CLINIC | Age: 83
End: 2018-12-05

## 2018-12-05 ENCOUNTER — APPOINTMENT (OUTPATIENT)
Dept: LAB | Facility: HOSPITAL | Age: 83
End: 2018-12-05
Attending: INTERNAL MEDICINE
Payer: MEDICARE

## 2018-12-05 DIAGNOSIS — I48.91 ATRIAL FIBRILLATION, UNSPECIFIED TYPE (HCC): ICD-10-CM

## 2018-12-05 LAB — VENIPUNCTURE: NORMAL

## 2018-12-06 ENCOUNTER — TELEPHONE (OUTPATIENT)
Dept: CARDIOLOGY CLINIC | Facility: CLINIC | Age: 83
End: 2018-12-06

## 2018-12-06 NOTE — TELEPHONE ENCOUNTER
PT CALLED AND HAS A QUESTION ABOUT COUMADIN INSTRUCTIONS  PT FEELS SHE SHOULD HAVE HER NEXT BW IN 4 WEEKS AND NOT 2 WEEKS  PLEASE CALL PT TO DISCUSS   Prakash Cornell

## 2018-12-07 ENCOUNTER — TRANSCRIBE ORDERS (OUTPATIENT)
Dept: LAB | Facility: HOSPITAL | Age: 83
End: 2018-12-07

## 2018-12-07 DIAGNOSIS — I48.0 PAROXYSMAL ATRIAL FIBRILLATION (HCC): Primary | ICD-10-CM

## 2018-12-11 ENCOUNTER — TELEPHONE (OUTPATIENT)
Dept: CARDIOLOGY CLINIC | Facility: CLINIC | Age: 83
End: 2018-12-11

## 2024-09-18 NOTE — PROGRESS NOTES
REPORT NAME: Patient Visit Summary Report   VISIT DATE: 10/24/2016  VISIT TIME: 8:59 AM EDT  PATIENT NAME: Sukhdev Garcia  MEDICAL RECORD NUMBER: [id number]  SOCIAL SECURITY NUMBER:   YOB: 1934  AGE: 80  REFERRING PHYSICIAN: Edmundo Tijerina  SUPERVISING CLINICIAN: Edmundo Tijerina  HEALTH CARE PROFESSIONAL: Abby Bennett   PATIENT HOME ADDRESS: [place] [id number], [facility] [address] PHONE: [phone]  DIAGNOSIS 1: Atrial Fibrillation / [id number]  DIAGNOSIS 2: Long-term (current) use of anticoagulants / [id number] 61  DIAGNOSIS 3:   DIAGNOSIS 4:   INR RANGE: 2 - 3  INR GOAL: 2 5  TREATMENT START DATE:   TREATMENT END DATE:   NEXT VISIT:       VISIT RESULTS   ENCOUNTER NUMBER:   TEST LOCATION: [facility]  TEST TYPE: Outside Lab (Venipuncture)  VISIT TYPE:   CURRENT INR: 2 67 PROTIME:   SPECIMEN COL AND RPT DATE: 10/24/2016 8:59 AM  EDT    VITAL SIGNS  PULSE:  B/P:  WEIGHT:  HEIGHT:  TEMP:     CURRENT ANTICOAGULANT DOSING SCHEDULE  DOSE SIZE: 5mg    ANTICOAGULANT TYPE: COUMADIN  DOSING REGIMEN  Sun       Mon Tues Wed Thurs Fri       Sat  Total/Wk  2         1         2         1         2         1         2         11    PATIENT MEDICATION INSTRUCTION: Yes  PATIENT NUTRITIONAL COUNSELING: No  PATIENT BRUISING INSTRUCTION: No      LAST EDUCATION DATE:       PREVIOUS VISIT INFORMATION  VISITDATE   INR Goal  INR   Sun     Mon Tues Wed Thurs Fri  Sat     Total/wk  10/24/2016  2 5       2 67  2       1       2       1       2       1  2       11  9/28/2016   2 5       3 19  2       1       2       1       2       1  2       11  9/14/2016   2 5       3 57  2       1       2       1       2       1  2       11  8/24/2016   2 5       3 53  2       2       1       2       2       1  2       12    ADDITIONAL PREVIOUS VISIT INFORMATION  VISITDATE   PRIMARY RX               DOSE      CrCl  10/24/2016  COUMADIN                 5mg                 9/28/2016 COUMADIN                 5mg                 9/14/2016   COUMADIN                 5mg                 8/24/2016   COUMADIN                 5mg                     OTHER CURRENT MEDICATIONS: COUMADIN      PROGRESS NOTES: PER AS/PA SAME DOSE RECHECK 3-4 WEEKS, SPOKE WITH PT    PATIENT INSTRUCTIONS: SEE PROGRESS NOTE    TEST LOCATION: [facility]   [facility])    Electronically signed by: Domingo Cade  on 10/25/2016 at 8:59 AM EDT [FreeTextEntry3] :  Gen:                        Well appearing, well nourished, NAD. Skin:                        Eccrine:                 Within normal limits   Hair:                                    Face:                       Back:                           Neuro:                     No focal deficits. Age appropriate. Psych:                     Appropriate affect.